# Patient Record
Sex: MALE | Race: WHITE | NOT HISPANIC OR LATINO | Employment: OTHER | URBAN - METROPOLITAN AREA
[De-identification: names, ages, dates, MRNs, and addresses within clinical notes are randomized per-mention and may not be internally consistent; named-entity substitution may affect disease eponyms.]

---

## 2020-03-11 ENCOUNTER — OFFICE VISIT (OUTPATIENT)
Dept: FAMILY MEDICINE CLINIC | Facility: CLINIC | Age: 75
End: 2020-03-11
Payer: MEDICARE

## 2020-03-11 VITALS
HEART RATE: 71 BPM | WEIGHT: 179 LBS | DIASTOLIC BLOOD PRESSURE: 80 MMHG | TEMPERATURE: 97.4 F | SYSTOLIC BLOOD PRESSURE: 126 MMHG | BODY MASS INDEX: 26.51 KG/M2 | RESPIRATION RATE: 18 BRPM | HEIGHT: 69 IN | OXYGEN SATURATION: 99 %

## 2020-03-11 DIAGNOSIS — L03.90 CELLULITIS, UNSPECIFIED CELLULITIS SITE: Primary | ICD-10-CM

## 2020-03-11 PROCEDURE — 1036F TOBACCO NON-USER: CPT | Performed by: FAMILY MEDICINE

## 2020-03-11 PROCEDURE — 1160F RVW MEDS BY RX/DR IN RCRD: CPT | Performed by: FAMILY MEDICINE

## 2020-03-11 PROCEDURE — 99203 OFFICE O/P NEW LOW 30 MIN: CPT | Performed by: FAMILY MEDICINE

## 2020-03-11 PROCEDURE — 3008F BODY MASS INDEX DOCD: CPT | Performed by: FAMILY MEDICINE

## 2020-03-11 RX ORDER — CEPHALEXIN 500 MG/1
500 CAPSULE ORAL EVERY 6 HOURS SCHEDULED
Qty: 40 CAPSULE | Refills: 0
Start: 2020-03-11 | End: 2020-03-11 | Stop reason: SDUPTHER

## 2020-03-11 RX ORDER — TRAMADOL HYDROCHLORIDE 50 MG/1
50 TABLET ORAL EVERY 6 HOURS PRN
Qty: 20 TABLET | Refills: 0
Start: 2020-03-11 | End: 2020-03-11 | Stop reason: SDUPTHER

## 2020-03-11 RX ORDER — CEPHALEXIN 500 MG/1
500 CAPSULE ORAL EVERY 6 HOURS SCHEDULED
Qty: 40 CAPSULE | Refills: 0 | Status: SHIPPED | OUTPATIENT
Start: 2020-03-11 | End: 2020-03-16

## 2020-03-11 RX ORDER — DOXYCYCLINE HYCLATE 100 MG
100 TABLET ORAL 2 TIMES DAILY
COMMUNITY
Start: 2020-03-05 | End: 2020-03-11

## 2020-03-11 RX ORDER — TRAMADOL HYDROCHLORIDE 50 MG/1
50 TABLET ORAL EVERY 6 HOURS PRN
Qty: 20 TABLET | Refills: 0 | Status: SHIPPED | OUTPATIENT
Start: 2020-03-11 | End: 2020-03-16

## 2020-03-11 RX ORDER — TRIAMCINOLONE ACETONIDE 1 MG/G
CREAM TOPICAL 2 TIMES DAILY
COMMUNITY
Start: 2020-03-09 | End: 2020-03-16

## 2020-03-11 RX ORDER — PRAVASTATIN SODIUM 40 MG
40 TABLET ORAL
COMMUNITY
Start: 2019-12-13

## 2020-03-11 NOTE — PROGRESS NOTES
Assessment/Plan:    No problem-specific Assessment & Plan notes found for this encounter  Diagnoses and all orders for this visit:    Cellulitis, unspecified cellulitis site  -     Discontinue: cephalexin (KEFLEX) 500 mg capsule; Take 1 capsule (500 mg total) by mouth every 6 (six) hours for 10 days  -     Discontinue: traMADol (ULTRAM) 50 mg tablet; Take 1 tablet (50 mg total) by mouth every 6 (six) hours as needed for moderate pain  -     Discontinue: cephalexin (KEFLEX) 500 mg capsule; Take 1 capsule (500 mg total) by mouth every 6 (six) hours for 10 days  -     Discontinue: traMADol (ULTRAM) 50 mg tablet; Take 1 tablet (50 mg total) by mouth every 6 (six) hours as needed for moderate pain  -     cephalexin (KEFLEX) 500 mg capsule; Take 1 capsule (500 mg total) by mouth every 6 (six) hours for 10 days  -     traMADol (ULTRAM) 50 mg tablet; Take 1 tablet (50 mg total) by mouth every 6 (six) hours as needed for moderate pain    Other orders  -     aspirin 81 MG tablet; Take 81 mg by mouth daily  -     Discontinue: doxycycline hyclate (VIBRA-TABS) 100 mg tablet; Take 100 mg by mouth 2 (two) times a day  -     insulin detemir (LEVEMIR FLEXTOUCH) 100 Units/mL injection pen; 32 units in AM  -     pravastatin (PRAVACHOL) 40 mg tablet; Take 40 mg by mouth  -     ticagrelor (BRILINTA) 90 MG; Take 90 mg by mouth 2 (two) times a day  -     triamcinolone (KENALOG) 0 1 % cream; Apply topically 2 (two) times a day          Discussed with the patient and all questioned fully answered  He will call me if any problems arise  Subjective:      Patient ID: Cody Singletary is a 66 y o  male  Chief Complaint   Patient presents with   [address]      estab  care,having pain left ankle using antibiotic,cream for  cellulitis,had x-ray done his bone is ok  started 3 wks  ago          66-year-old male comes in complaining of left lower leg pain, patient stated that he had this rash on the left lower leg approximately a week ago, he went to see her PCP had a x-ray done which came back negative was given triamcinolone cream and doxycycline, advised to stop moisturizer, patient stated it is noticed that his rash is getting bigger and the pain is getting worse, deny having fever        The following portions of the patient's history were reviewed and updated as appropriate: allergies, current medications, past family history, past medical history, past social history, past surgical history and problem list       Review of Systems   Constitutional: Negative for activity change, appetite change, fatigue and unexpected weight change  Musculoskeletal: Negative for arthralgias, back pain, gait problem and joint swelling  Neurological: Negative for dizziness and facial asymmetry  Psychiatric/Behavioral: Negative for agitation, behavioral problems, confusion and decreased concentration  Objective:    /80 (BP Location: Left arm, Patient Position: Sitting, Cuff Size: Standard)   Pulse 71   Temp (!) 97 4 °F (36 3 °C) (Tympanic)   Resp 18   Ht 5' 9" (1 753 m)   Wt 81 2 kg (179 lb)   SpO2 99%   BMI 26 43 kg/m²       Physical Exam   Constitutional:  oriented to person, place, and time  well-developed and well-nourished  No distress  Skin: Skin is warm with pitting edema, touch with pain sensation, (+) erythematous LLE, and warm to touch  Psychiatric: normal mood and affect  behavior is normal  Judgment and thought content normal

## 2020-03-16 ENCOUNTER — OFFICE VISIT (OUTPATIENT)
Dept: FAMILY MEDICINE CLINIC | Facility: CLINIC | Age: 75
End: 2020-03-16
Payer: MEDICARE

## 2020-03-16 VITALS
HEIGHT: 69 IN | TEMPERATURE: 97.6 F | OXYGEN SATURATION: 97 % | DIASTOLIC BLOOD PRESSURE: 72 MMHG | BODY MASS INDEX: 26.75 KG/M2 | SYSTOLIC BLOOD PRESSURE: 120 MMHG | WEIGHT: 180.6 LBS | HEART RATE: 68 BPM

## 2020-03-16 DIAGNOSIS — Z28.21 REFUSED INFLUENZA VACCINE: ICD-10-CM

## 2020-03-16 DIAGNOSIS — L03.116 CELLULITIS OF LEFT LOWER EXTREMITY: Primary | ICD-10-CM

## 2020-03-16 DIAGNOSIS — E11.43 DIABETIC AUTONOMIC NEUROPATHY ASSOCIATED WITH TYPE 2 DIABETES MELLITUS (HCC): ICD-10-CM

## 2020-03-16 DIAGNOSIS — L30.9 DERMATITIS: ICD-10-CM

## 2020-03-16 DIAGNOSIS — R60.0 BILATERAL LEG EDEMA: ICD-10-CM

## 2020-03-16 PROCEDURE — 1036F TOBACCO NON-USER: CPT | Performed by: FAMILY MEDICINE

## 2020-03-16 PROCEDURE — 99213 OFFICE O/P EST LOW 20 MIN: CPT | Performed by: FAMILY MEDICINE

## 2020-03-16 PROCEDURE — 1160F RVW MEDS BY RX/DR IN RCRD: CPT | Performed by: FAMILY MEDICINE

## 2020-03-16 RX ORDER — FUROSEMIDE 20 MG/1
TABLET ORAL
Qty: 90 TABLET | OUTPATIENT
Start: 2020-03-16

## 2020-03-16 RX ORDER — CLINDAMYCIN HYDROCHLORIDE 300 MG/1
300 CAPSULE ORAL 3 TIMES DAILY
Qty: 21 CAPSULE | Refills: 0 | Status: SHIPPED | OUTPATIENT
Start: 2020-03-16 | End: 2020-03-19

## 2020-03-16 RX ORDER — GABAPENTIN 300 MG/1
600 CAPSULE ORAL 3 TIMES DAILY
Qty: 180 CAPSULE | Refills: 3 | Status: SHIPPED | OUTPATIENT
Start: 2020-03-16 | End: 2020-03-19

## 2020-03-16 RX ORDER — FUROSEMIDE 20 MG/1
20 TABLET ORAL DAILY
Qty: 5 TABLET | Refills: 0 | Status: SHIPPED | OUTPATIENT
Start: 2020-03-16 | End: 2020-03-19

## 2020-03-16 RX ORDER — CLOBETASOL PROPIONATE 0.5 MG/G
CREAM TOPICAL 2 TIMES DAILY
Qty: 60 G | Refills: 3 | Status: SHIPPED | OUTPATIENT
Start: 2020-03-16 | End: 2020-03-19

## 2020-03-17 NOTE — PROGRESS NOTES
Assessment/Plan:    No problem-specific Assessment & Plan notes found for this encounter  Diagnoses and all orders for this visit:    Cellulitis of left lower extremity  -     clindamycin (CLEOCIN) 300 MG capsule; Take 1 capsule (300 mg total) by mouth 3 (three) times a day for 7 days    Dermatitis  -     clobetasol (TEMOVATE) 0 05 % cream; Apply topically 2 (two) times a day    Bilateral leg edema  -     furosemide (LASIX) 20 mg tablet; Take 1 tablet (20 mg total) by mouth daily    Diabetic autonomic neuropathy associated with type 2 diabetes mellitus (HCC)  -     gabapentin (NEURONTIN) 300 mg capsule; Take 2 capsules (600 mg total) by mouth 3 (three) times a day    Refused influenza vaccine  -     influenza vaccine, 4820-3160, high-dose, PF 0 5 mL (FLUZONE HIGH-DOSE)          Discussed with the patient and all questioned fully answered  He will call me if any problems arise  Subjective:      Patient ID: Martine Bob is a 66 y o  male  Chief Complaint   Patient presents with    Cellulitis     follow up for cellulitis       66year old male, comes in for follow up his cellulitis, pt reports that her cellulitis didn't improve at all, having a lot of pain, despite tamadol, n redness still there, didn't show improvement, no fever      The following portions of the patient's history were reviewed and updated as appropriate: allergies, current medications, past family history, past medical history, past social history, past surgical history and problem list       Review of Systems   Constitutional: Negative for activity change, appetite change, fatigue and unexpected weight change  Skin: Erythematous, mild swelling, sensitive to touch  Psychiatric/Behavioral: Negative for agitation, behavioral problems, confusion and decreased concentration         Objective:    /72 (BP Location: Left arm, Patient Position: Sitting, Cuff Size: Large)   Pulse 68   Temp 97 6 °F (36 4 °C) (Tympanic)   Ht 5' 9" (1 753 m)   Wt 81 9 kg (180 lb 9 6 oz)   SpO2 97%   BMI 26 67 kg/m²       Physical Exam   Constitutional:  oriented to person, place, and time  well-developed and well-nourished  No distress  Skin: Skin is warm and dry  Erythematous, mild swelling, sensitive to touch- left lower leg  Psychiatric: normal mood and affect  behavior is normal  Judgment and thought content normal

## 2020-03-18 DIAGNOSIS — R60.0 BILATERAL LEG EDEMA: ICD-10-CM

## 2020-03-18 RX ORDER — FUROSEMIDE 20 MG/1
TABLET ORAL
Qty: 5 TABLET | Refills: 0 | OUTPATIENT
Start: 2020-03-18

## 2020-03-19 ENCOUNTER — OFFICE VISIT (OUTPATIENT)
Dept: FAMILY MEDICINE CLINIC | Facility: CLINIC | Age: 75
End: 2020-03-19
Payer: MEDICARE

## 2020-03-19 VITALS
HEART RATE: 68 BPM | TEMPERATURE: 96.1 F | WEIGHT: 181 LBS | HEIGHT: 69 IN | BODY MASS INDEX: 26.81 KG/M2 | DIASTOLIC BLOOD PRESSURE: 68 MMHG | RESPIRATION RATE: 16 BRPM | OXYGEN SATURATION: 98 % | SYSTOLIC BLOOD PRESSURE: 110 MMHG

## 2020-03-19 DIAGNOSIS — R60.0 BILATERAL LEG EDEMA: ICD-10-CM

## 2020-03-19 DIAGNOSIS — E11.43 DIABETIC AUTONOMIC NEUROPATHY ASSOCIATED WITH TYPE 2 DIABETES MELLITUS (HCC): ICD-10-CM

## 2020-03-19 DIAGNOSIS — R21 RASH AND OTHER NONSPECIFIC SKIN ERUPTION: ICD-10-CM

## 2020-03-19 DIAGNOSIS — L03.116 CELLULITIS OF LEFT LOWER EXTREMITY: ICD-10-CM

## 2020-03-19 DIAGNOSIS — L30.9 DERMATITIS: ICD-10-CM

## 2020-03-19 DIAGNOSIS — R52 PAIN: Primary | ICD-10-CM

## 2020-03-19 PROCEDURE — 99213 OFFICE O/P EST LOW 20 MIN: CPT | Performed by: FAMILY MEDICINE

## 2020-03-19 PROCEDURE — 1036F TOBACCO NON-USER: CPT | Performed by: FAMILY MEDICINE

## 2020-03-19 PROCEDURE — 1160F RVW MEDS BY RX/DR IN RCRD: CPT | Performed by: FAMILY MEDICINE

## 2020-03-19 RX ORDER — LIDOCAINE HYDROCHLORIDE 20 MG/ML
JELLY TOPICAL 3 TIMES DAILY PRN
Qty: 30 ML | Refills: 3 | Status: SHIPPED | OUTPATIENT
Start: 2020-03-19 | End: 2020-03-23

## 2020-03-19 RX ORDER — GABAPENTIN 300 MG/1
300 CAPSULE ORAL 3 TIMES DAILY
Start: 2020-03-19 | End: 2020-05-26 | Stop reason: SDUPTHER

## 2020-03-19 RX ORDER — PREDNISONE 20 MG/1
40 TABLET ORAL DAILY
Qty: 10 TABLET | Refills: 0 | Status: SHIPPED | OUTPATIENT
Start: 2020-03-19 | End: 2020-03-23

## 2020-03-20 NOTE — PROGRESS NOTES
Assessment/Plan:    No problem-specific Assessment & Plan notes found for this encounter  Diagnoses and all orders for this visit:    Pain  -     lidocaine (XYLOCAINE) 2 % topical gel; Apply topically 3 (three) times a day as needed for mild pain    Dermatitis  -     predniSONE 20 mg tablet; Take 2 tablets (40 mg total) by mouth daily for 5 days    Cellulitis of left lower extremity  -     CBC and differential; Future    Rash and other nonspecific skin eruption  -     Lyme Antibody Profile with reflex to WB  -     Varicella zoster antibody, IgM; Future  -     Sedimentation rate, automated; Future    Bilateral leg edema  -     Comprehensive metabolic panel    Diabetic autonomic neuropathy associated with type 2 diabetes mellitus (HCC)  -     gabapentin (NEURONTIN) 300 mg capsule; Take 1 capsule (300 mg total) by mouth 3 (three) times a day        I think pt may have RDS *reflex sympathetic dystrophy syndrome" will try steroid, lidocaine, and decrease gabapentin, have him follow yp next week  Discussed with the patient and all questioned fully answered  He will call me if any problems arise  Subjective:      Patient ID: Blanca Frank is a 66 y o  male  Chief Complaint   Patient presents with    Follow-up     f/u visit on left lower extremity pain is still the same ,no new food or drug allergies          66year old male, comes in for follow up his cellulitis, pt reports that her cellulitis didn't improve at all, having a lot of pain, unable to tolerate gabapentin 600mg TID, stated he feel wired, and dizzy,  Still having a lot of pain, redness still there, didn't show improvement, no fever        The following portions of the patient's history were reviewed and updated as appropriate: allergies, current medications, past family history, past medical history, past social history, past surgical history and problem list       Review of Systems   Constitutional: Negative for activity change, appetite change, fatigue and unexpected weight change  Musculoskeletal: Negative for arthralgias, back pain, gait problem and joint swelling  Skin: Erythematous, mild swelling, sensitive to touch  Psychiatric/Behavioral: Negative for agitation, behavioral problems, confusion and decreased concentration  Objective:    /68 (BP Location: Left arm, Patient Position: Sitting, Cuff Size: Standard)   Pulse 68   Temp (!) 96 1 °F (35 6 °C) (Tympanic)   Resp 16   Ht 5' 9" (1 753 m)   Wt 82 1 kg (181 lb)   SpO2 98%   BMI 26 73 kg/m²       Physical Exam   Constitutional:  oriented to person, place, and time  well-developed and well-nourished  No distress  Musculoskeletal: Normal range of motion  Neurological:  alert and oriented to person, place, and time  normal reflexes  Skin: Skin is warm and dry  mild Erythematous, mild swelling, sensitive to touch  Psychiatric: normal mood and affect  behavior is normal  Judgment and thought content normal

## 2020-03-23 ENCOUNTER — TELEMEDICINE (OUTPATIENT)
Dept: FAMILY MEDICINE CLINIC | Facility: CLINIC | Age: 75
End: 2020-03-23
Payer: MEDICARE

## 2020-03-23 DIAGNOSIS — G90.50 REFLEX SYMPATHETIC DYSTROPHY: Primary | ICD-10-CM

## 2020-03-23 PROCEDURE — 99443 PR PHYS/QHP TELEPHONE EVALUATION 21-30 MIN: CPT | Performed by: FAMILY MEDICINE

## 2020-03-23 NOTE — PROGRESS NOTES
Virtual Brief Visit    Reason for visit is follow up last visit for rash and pain on the leg      Encounter provider Sid Main MD    Provider located at [address] [place] [id number]      Recent Visits  Date Type Provider Dept   03/19/20 Office Visit Sid Main MD [facility]   03/16/20 Office Visit Sid Main MD [facility]   Showing recent visits within past 7 days and meeting all other requirements     Future Appointments  No visits were found meeting these conditions  Showing future appointments within next 150 days and meeting all other requirements        Patient agrees to participate in a virtual check in via telephone or video visit instead of presenting to the office to address urgent/immediate medical needs  Patient is aware this is a billable service  After connecting through telephone, the patient was identified by name and date of birth  Lizeth Jacinto was informed that this was a telemedicine visit and that the visit is being conducted through telephone which may not be secure and therefore might not be HIPAA-compliant  My office door was closed  No one else was in the room  He acknowledged consent and understanding of privacy and security of the virtual check-in visit  I informed the patient that I have reviewed his record in Epic and presented the opportunity for him to ask any questions regarding the visit today  The patient initiated communication and agreed to participate  Subjective  Lizeth Jacinto is a 66 y o  male, patient has appointment for follow up his pain and possible rashes, patient stated that he still have a lot of pain, however he is only on gabapentin for 3 days, still on prednisone, and the redness still the same, ESR slightly elevated, varicella and lyme's titer still pending         Past Medical History:   Diagnosis Date    Allergic     Chronic leg pain     Resolved 10/16/2014  Coronary artery disease     Diabetes mellitus ([facility] )     Diabetes mellitus type II, uncontrolled ([place] [address] )     Last assessed 8/10/2014     Diabetes mellitus with neurological manifestation ([place] [address] )     Last assessed 10/2/2014     Type 2 diabetes mellitus with hyperglycemia ([place] [address] )     Last assessed 7/9/2013     Varicose veins of both lower extremities     Last assessed 10/11/2010        Past Surgical History:   Procedure Laterality Date    CORONARY STENT PLACEMENT      2 stents placed    VARICOSE VEIN SURGERY      15 yrs  ago both legs  Current Outpatient Medications   Medication Sig Dispense Refill    aspirin 81 MG tablet Take 81 mg by mouth daily      gabapentin (NEURONTIN) 300 mg capsule Take 1 capsule (300 mg total) by mouth 3 (three) times a day      insulin detemir (LEVEMIR FLEXTOUCH) 100 Units/mL injection pen 32 units in AM      pravastatin (PRAVACHOL) 40 mg tablet Take 40 mg by mouth      ticagrelor (BRILINTA) 90 MG Take 90 mg by mouth 2 (two) times a day       No current facility-administered medications for this visit  Allergies   Allergen Reactions    Glyburide      Rash and facial swelling      Latex     Medical Tape     Metformin     Morphine Hives    Penicillins     Prasugrel Hives    Repaglinide      Diarrhea and joint pain    Sulfa Antibiotics     Clopidogrel Rash       Assessment    [place] telephone assessment is Reflex sympathetic dystrophy   Disposition:    Spoke with patient I think that's his diagnosis, advise him to take his gabapentin as prescribe and go up to 2 tabs TID eventually, pt was asking Percocet, advise that's not the best meds for neuropathy pain, in addition to side effect, risk more than benefit, advise to try titrate Gabapentin up first and referred to rheumatologist    I spent 30 minutes with the patient during this virtual check-in visit

## 2020-03-26 ENCOUNTER — TELEPHONE (OUTPATIENT)
Dept: FAMILY MEDICINE CLINIC | Facility: CLINIC | Age: 75
End: 2020-03-26

## 2020-03-26 NOTE — TELEPHONE ENCOUNTER
Rash the same  Gabapentin is helping a little  Patient keep apt for July  To continue gabapentin for the burning pain   Verbalized understanding

## 2020-03-26 NOTE — TELEPHONE ENCOUNTER
Patient is looking for a new rheumatologist referral - the one previously recommended does not have availability until July  Thank you

## 2020-04-01 ENCOUNTER — TELEPHONE (OUTPATIENT)
Dept: FAMILY MEDICINE CLINIC | Facility: CLINIC | Age: 75
End: 2020-04-01

## 2020-04-01 DIAGNOSIS — G90.50 REFLEX NEUROVASCULAR DYSTROPHY: Primary | ICD-10-CM

## 2020-05-26 DIAGNOSIS — E11.43 DIABETIC AUTONOMIC NEUROPATHY ASSOCIATED WITH TYPE 2 DIABETES MELLITUS (HCC): ICD-10-CM

## 2020-05-26 RX ORDER — GABAPENTIN 300 MG/1
300 CAPSULE ORAL 3 TIMES DAILY
Start: 2020-05-26

## 2020-05-28 ENCOUNTER — TELEPHONE (OUTPATIENT)
Dept: FAMILY MEDICINE CLINIC | Facility: CLINIC | Age: 75
End: 2020-05-28

## 2021-05-13 ENCOUNTER — IMMUNIZATIONS (OUTPATIENT)
Dept: FAMILY MEDICINE CLINIC | Facility: HOSPITAL | Age: 76
End: 2021-05-13

## 2021-05-13 DIAGNOSIS — Z23 ENCOUNTER FOR IMMUNIZATION: Primary | ICD-10-CM

## 2021-05-13 PROCEDURE — 0001A SARS-COV-2 / COVID-19 MRNA VACCINE (PFIZER-BIONTECH) 30 MCG: CPT

## 2021-05-13 PROCEDURE — 91300 SARS-COV-2 / COVID-19 MRNA VACCINE (PFIZER-BIONTECH) 30 MCG: CPT

## 2021-06-03 ENCOUNTER — IMMUNIZATIONS (OUTPATIENT)
Dept: FAMILY MEDICINE CLINIC | Facility: HOSPITAL | Age: 76
End: 2021-06-03

## 2021-06-03 DIAGNOSIS — Z23 ENCOUNTER FOR IMMUNIZATION: Primary | ICD-10-CM

## 2021-06-03 PROCEDURE — 91300 SARS-COV-2 / COVID-19 MRNA VACCINE (PFIZER-BIONTECH) 30 MCG: CPT

## 2021-06-03 PROCEDURE — 0002A SARS-COV-2 / COVID-19 MRNA VACCINE (PFIZER-BIONTECH) 30 MCG: CPT

## 2023-12-29 ENCOUNTER — TELEPHONE (OUTPATIENT)
Dept: OTHER | Facility: OTHER | Age: 78
End: 2023-12-29

## 2023-12-29 DIAGNOSIS — M86.18 OTHER ACUTE OSTEOMYELITIS, OTHER SITE (HCC): Primary | ICD-10-CM

## 2023-12-29 RX ORDER — PREGABALIN 75 MG/1
75 CAPSULE ORAL 3 TIMES DAILY
Qty: 30 CAPSULE | Refills: 0 | Status: SHIPPED | OUTPATIENT
Start: 2023-12-29

## 2023-12-29 RX ORDER — OXYCODONE HYDROCHLORIDE 5 MG/1
5 TABLET ORAL ONCE
Qty: 1 TABLET | Refills: 0 | Status: SHIPPED | OUTPATIENT
Start: 2023-12-29 | End: 2023-12-29

## 2024-01-02 ENCOUNTER — NURSING HOME VISIT (OUTPATIENT)
Dept: GERIATRICS | Facility: OTHER | Age: 79
End: 2024-01-02
Payer: MEDICARE

## 2024-01-02 DIAGNOSIS — K25.0 ACUTE GASTRIC ULCER WITH HEMORRHAGE: ICD-10-CM

## 2024-01-02 DIAGNOSIS — M25.562 ACUTE PAIN OF LEFT KNEE: ICD-10-CM

## 2024-01-02 DIAGNOSIS — D62 ACUTE BLOOD LOSS ANEMIA (ABLA): ICD-10-CM

## 2024-01-02 DIAGNOSIS — E11.65 TYPE 2 DIABETES MELLITUS WITH HYPERGLYCEMIA, UNSPECIFIED WHETHER LONG TERM INSULIN USE (HCC): ICD-10-CM

## 2024-01-02 DIAGNOSIS — I25.10 CORONARY ARTERY DISEASE INVOLVING NATIVE CORONARY ARTERY OF NATIVE HEART WITHOUT ANGINA PECTORIS: ICD-10-CM

## 2024-01-02 DIAGNOSIS — M86.9 OSTEOMYELITIS OF SKULL (HCC): ICD-10-CM

## 2024-01-02 DIAGNOSIS — N18.2 CKD (CHRONIC KIDNEY DISEASE) STAGE 2, GFR 60-89 ML/MIN: ICD-10-CM

## 2024-01-02 DIAGNOSIS — H60.22 ACUTE MALIGNANT OTITIS EXTERNA OF LEFT EAR: Primary | ICD-10-CM
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
## 2024-01-02 DIAGNOSIS — K26.9 DUODENAL ULCER: ICD-10-CM

## 2024-01-02 DIAGNOSIS — R53.81 PHYSICAL DECONDITIONING: ICD-10-CM

## 2024-01-02 DIAGNOSIS — K92.2 UPPER GI BLEED: ICD-10-CM

## 2024-01-02 PROBLEM — I10 HYPERTENSION, ESSENTIAL: Chronic | Status: ACTIVE | Noted: 2017-12-03

## 2024-01-02 PROBLEM — K25.3 ACUTE GASTRIC ULCER: Status: ACTIVE | Noted: 2023-12-15

## 2024-01-02 PROBLEM — G50.0 TRIGEMINAL NEURALGIA: Chronic | Status: ACTIVE | Noted: 2023-11-22

## 2024-01-02 PROBLEM — Z95.5 HISTORY OF CORONARY ARTERY STENT PLACEMENT: Chronic | Status: ACTIVE | Noted: 2021-05-06

## 2024-01-02 PROBLEM — Z86.718 HISTORY OF DVT OF LOWER EXTREMITY: Status: ACTIVE | Noted: 2021-05-06

## 2024-01-02 PROCEDURE — 99306 1ST NF CARE HIGH MDM 50: CPT | Performed by: FAMILY MEDICINE

## 2024-01-02 RX ORDER — SENNOSIDES 8.6 MG
975 CAPSULE ORAL 3 TIMES DAILY
COMMUNITY

## 2024-01-02 RX ORDER — SENNOSIDES A AND B 8.6 MG/1
8.6 TABLET, FILM COATED ORAL DAILY
COMMUNITY
Start: 2023-12-30 | End: 2024-01-29

## 2024-01-02 RX ORDER — HYDROCODONE BITARTRATE AND ACETAMINOPHEN 5; 325 MG/1; MG/1
1 TABLET ORAL EVERY 6 HOURS PRN
COMMUNITY
Start: 2023-12-29 | End: 2024-01-03

## 2024-01-02 RX ORDER — FERROUS SULFATE 325(65) MG
325 TABLET ORAL
COMMUNITY
Start: 2023-12-30 | End: 2024-12-29

## 2024-01-02 RX ORDER — NITROGLYCERIN 0.4 MG/1
0.4 TABLET SUBLINGUAL
COMMUNITY

## 2024-01-02 RX ORDER — METRONIDAZOLE 500 MG/1
500 TABLET ORAL 3 TIMES DAILY
COMMUNITY
Start: 2023-12-29 | End: 2024-01-12

## 2024-01-02 RX ORDER — CIPROFLOXACIN 750 MG/1
750 TABLET, FILM COATED ORAL 2 TIMES DAILY
COMMUNITY
Start: 2023-12-29 | End: 2024-02-09

## 2024-01-02 RX ORDER — POLYETHYLENE GLYCOL 3350 17 G/17G
17 POWDER, FOR SOLUTION ORAL DAILY PRN
COMMUNITY
Start: 2023-12-29 | End: 2024-01-12

## 2024-01-02 RX ORDER — CHOLECALCIFEROL (VITAMIN D3) 1250 MCG
5000 CAPSULE ORAL DAILY
COMMUNITY

## 2024-01-02 RX ORDER — METHOCARBAMOL 750 MG/1
750 TABLET, FILM COATED ORAL EVERY 6 HOURS PRN
COMMUNITY
Start: 2023-12-29 | End: 2024-01-08

## 2024-01-02 RX ORDER — PANTOPRAZOLE SODIUM 40 MG/1
1 TABLET, DELAYED RELEASE ORAL
COMMUNITY
Start: 2023-12-29 | End: 2024-12-28

## 2024-01-02 RX ORDER — KETOCONAZOLE 20 MG/G
1 CREAM TOPICAL 2 TIMES DAILY
COMMUNITY
Start: 2023-12-29 | End: 2024-01-10

## 2024-01-02 RX ORDER — BENZONATATE 100 MG/1
100 CAPSULE ORAL 3 TIMES DAILY PRN
COMMUNITY
Start: 2023-12-29 | End: 2024-01-08

## 2024-01-02 RX ORDER — CHOLECALCIFEROL (VITAMIN D3) 125 MCG
5 CAPSULE ORAL
COMMUNITY
Start: 2023-12-29

## 2024-01-02 RX ORDER — CIPROFLOXACIN AND DEXAMETHASONE 3; 1 MG/ML; MG/ML
4 SUSPENSION/ DROPS AURICULAR (OTIC) 2 TIMES DAILY
COMMUNITY
Start: 2023-12-29 | End: 2024-01-03

## 2024-01-02 RX ORDER — DIPHENHYDRAMINE HCL 25 MG
25 CAPSULE ORAL EVERY 6 HOURS PRN
COMMUNITY
Start: 2023-12-29 | End: 2024-01-08

## 2024-01-02 NOTE — PROGRESS NOTES
[facility]  History and Physical  POS: SNF-31    Records Reviewed include: [facility] records    Chief Complaint/ Reason for Admission: Acute malignant otitis externa of left ear with mastoiditis and osteomyelitis; DM2 with hyperglycemia; Trigeminal neuralgia; GI bleed with ABLA    History of Present Illness:            78 year old male admitted for [facility] rehab following prolonged hospitalization at [facility]. Initially presented with left sided facial pain; initial workup and treatment for trigeminal neuralgia; workup for temporal arteritis negative. ENT ultimately consulted and patient found to have malignant otitis externa with associated mastoiditis/osteomyelitis; biopsy growing pseudomonas; evaluated by ID and continues on antibiotic therapy- oral + topical ear drops at present. Continues with complaints of pain and headache; current regimen includes Lyrica 75mg TID and PRN acetaminophen, PRN robaxin and PRN Norco. Has used PRN tylenol x1 over past 24h; no PRN Norco or robaxin use over past 24h. Patient reports pain in his left knee, inferior medial to knee with associated bruising noted on anterior knee; reports a fall at home prior to hospitalization and reports difficulty bearing weight on his LLE due to pain. Hospital course also significant for GI bleed with acute blood loss anemia (required PRBC transfusion); found to have multiple gastric and duodenal ulcers. Additional active medical issues include poorly controlled DM2 with hyperglycemia; Glucose (BID accuchecks) fastin-185 with outlier of 277; QHS: 192-390 over past 48h. Wife at bedside at time of admission exam and provided additional history; plan of care reviewed with patient and wife.  I have spent a total time of 65 minutes on 24 in caring for this patient including extensive review of hospital records (prolonged hospital stay at [facility]  Driss and Maikel Haro from 11/23/23-12/29/23; patient evaluation; reviewed, reconciliation and ordering of medications; review of and ordering of labs; reviewing risks vs benefits of pain medications; coordination of care with nursing.           Allergies   Allergen Reactions    Glyburide      Rash and facial swelling      Latex     Medical Tape     Metformin     Morphine Hives    Penicillins     Prasugrel Hives    Repaglinide      Diarrhea and joint pain    Sulfa Antibiotics     Clopidogrel Rash        Past Medical History  Past Medical History:   Diagnosis Date    Allergic     Chronic leg pain     Resolved 10/16/2014     Coronary artery disease     Diabetes mellitus (HCC)     Diabetes mellitus type II, uncontrolled     Last assessed 8/10/2014     Diabetes mellitus with neurological manifestation (HCC)     Last assessed 10/2/2014     Type 2 diabetes mellitus with hyperglycemia (HCC)     Last assessed 7/9/2013     Varicose veins of both lower extremities     Last assessed 10/11/2010         Past Surgical History:   Procedure Laterality Date    CORONARY STENT PLACEMENT      2 stents placed    VARICOSE VEIN SURGERY      15 yrs. ago both legs.         Family History   Problem Relation Age of Onset    Diabetes Father     COPD Daughter     Breast cancer additional onset Daughter     Diabetes Mother     Heart disease Mother 60        Social History  Tobacco Use: Low Risk  (1/2/2024)    Patient History     Smoking Tobacco Use: Never     Smokeless Tobacco Use: Never     Passive Exposure: Not on file           Physical Exam    Weight: 165.6lb Temp:97.2F BP:135/70 Pulse:95 Resp:18 O2 Sat:94%RA    Physical Exam  Vitals and nursing note reviewed.   Constitutional:       General: He is awake. He is not in acute distress.     Appearance: He is well-groomed. He is not toxic-appearing or diaphoretic.   HENT:      Head: Normocephalic and atraumatic.      Nose: No rhinorrhea.      Mouth/Throat:      Mouth: Mucous membranes are  moist.   Eyes:      General: No scleral icterus.        Right eye: No discharge.         Left eye: No discharge.   Pulmonary:      Effort: Pulmonary effort is normal. No respiratory distress.   Musculoskeletal:      Cervical back: No rigidity.      Left knee: Ecchymosis (inferior-medial patellar area) and bony tenderness (medial tibial plateau point tenderness) present. Normal patellar mobility.      Right lower leg: No edema.      Left lower leg: No edema.   Skin:     Coloration: Skin is not jaundiced or pale.   Neurological:      Mental Status: He is alert.      Cranial Nerves: No dysarthria or facial asymmetry.   Psychiatric:         Attention and Perception: Attention and perception normal.         Speech: Speech normal.         Behavior: Behavior is cooperative.         Thought Content: Thought content normal.       Review of Systems:  Review of Systems   Constitutional:  Negative for chills and fever.   HENT:  Positive for ear pain.    Respiratory:  Negative for cough and shortness of breath.    Musculoskeletal:  Positive for arthralgias and gait problem.   Neurological:  Positive for headaches.        List of Current Medications: Medication list reviewed and updated in Epic to reflect most current SNF orders    Labs/Diagnostics (reviewed by this provider): Hospital Paperwork  BMP (12/28/23) Na 141 K 3.7 Na 141 BUN 15 Creat 0.88 (peak 1.14)  CBC (12/28/23) Hgb 9.2 (min 7.1)  WBC 5.3 Plt 280  CRP (12/26/23) 24.5 <- 48.6 <116.6 <192.8  Tissue Cx (12/14/23) Pseudomonas  A1c (11/23/23) 13.8    Imaging Reviewed:  NM WBC Spect (12/28/23) subtle asymmetric activity of left mastoid region  Xray L knee (12/8/20) remote fibular tip fracture  EGD (12/11/23)  PRBC transfusion (12/8/23, 12/3/23)  CXR (12/7/23) bilateral upper lobe opacities  MRA head/neck (12/3/23)  Echo (12/3/23) EF 60-65%, grade 1 diastolic dysfunction  EKG (12/2/23)    Assessment/Plan:  88 year old male with:    Acute malignant otitis externa of left  ear  Tissue cultures positive Pseudomonas  ID managing antibiotics- continue cipro 750mg BID through 1/22/24 (EKG pending for 1/11/24 to reassess QTc); continue metronidazole 500mg TID through 1/9/24; continue ciprodex ear drops through 1/4/24  Follow up with ID and ENT  Well adjust medications for better pain control: Schedule acetaminophen 975mg TID, max 3g/24h. D/c Norco. Start OxyIR 5mg Q6h PRN severe pain. Continue lyrica 75mg BID.    Osteomyelitis of skull (HCC)  In area of the left mastoid in setting of above    Upper GI bleed  In setting of acute gastric and duodenal ulcers  Continue pantoprazole 40mg BID  Follow up with GI as scheduled  At risk for recurrent bleed on chronic aspirin (CAD s/p stent); per record review, was on prednisone prior to hospitalization as well which may have contributed to ulcer formation    Acute blood loss anemia (ABLA)  In setting of above  S/p PRBC transfusion inpatient  Continue ferrous sulfate  CBC ordered    Type 2 diabetes mellitus with hyperglycemia (HCC)    Lab Results   Component Value Date    HGBA1C 13.8 (H) 11/23/2023   Poorly controlled at baseline; will need close outpatient follow up with PCP and endocrinology  See glucose trends as per HPI- increase levemir to 38u SQ QHS with further titration as needed, with addition of short acting insulin- scheduled vs sliding scale- as needed as well. Goal would be once daily insulin to ease administration at home but may not be able to accomplish this in setting of significant hyperglycemia and A1c elevation- last checked at time of hospital admission  Dietary/nutrition consult    CKD (chronic kidney disease) stage 2, GFR 60-89 ml/min  Baseline creatinine 1.1 per review of previous records  At risk for PAULINO in setting of ABLA and prolonged antibiotic use  BMP ordered    CAD (coronary artery disease)  S/p stent in 2017    Acute pain of left knee  In setting of recent fall prior to hospital admission  With bruising and point  tenderness noted on exam- will check left knee xray- 4 view  Did not have inpatient imaging of his LLE  Xray L knee from 2020 significant for remote fibular tip fracture  Likely underlying component of OA and muscular atrophy due to prolonged hopsitalization/ deconditioning also contributing  Scheduled acetaminophen added as above    Physical deconditioning  Multifactorial  Admit to SNF for rehab  PT and OT consults placed- evaluate and treat  Supportive care, nutritional support, ADL support  Fall precautions   Management of acute and chronic medical conditions as outlined     Advanced Directives: POLST completed following SNF admission  Code status:Full Code  PCP: DO Sherice Marquez,   1/2/24

## 2024-01-03 PROBLEM — I25.10 CAD (CORONARY ARTERY DISEASE): Status: ACTIVE | Noted: 2024-01-03

## 2024-01-03 PROBLEM — M86.9 OSTEOMYELITIS OF SKULL (HCC): Status: ACTIVE | Noted: 2024-01-03
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
## 2024-01-03 PROBLEM — K92.2 UPPER GI BLEED: Status: ACTIVE | Noted: 2024-01-03

## 2024-01-03 PROBLEM — M25.562 ACUTE PAIN OF LEFT KNEE: Status: ACTIVE | Noted: 2024-01-03

## 2024-01-03 PROBLEM — N18.2 CKD (CHRONIC KIDNEY DISEASE) STAGE 2, GFR 60-89 ML/MIN: Status: ACTIVE | Noted: 2024-01-03

## 2024-01-03 PROBLEM — R53.81 PHYSICAL DECONDITIONING: Status: ACTIVE | Noted: 2024-01-03

## 2024-01-03 RX ORDER — OXYCODONE HYDROCHLORIDE 5 MG/1
5 TABLET ORAL EVERY 6 HOURS PRN
Start: 2024-01-03

## 2024-01-03 NOTE — ASSESSMENT & PLAN NOTE
Baseline creatinine 1.1 per review of previous records  At risk for PAULINO in setting of ABLA and prolonged antibiotic use  BMP ordered

## 2024-01-03 NOTE — ASSESSMENT & PLAN NOTE
Lab Results   Component Value Date    HGBA1C 13.8 (H) 11/23/2023   Poorly controlled at baseline; will need close outpatient follow up with PCP and endocrinology  See glucose trends as per HPI- increase levemir to 38u SQ QHS with further titration as needed, with addition of short acting insulin- scheduled vs sliding scale- as needed as well. Goal would be once daily insulin to ease administration at home but may not be able to accomplish this in setting of significant hyperglycemia and A1c elevation- last checked at time of hospital admission  Dietary/nutrition consult

## 2024-01-03 NOTE — ASSESSMENT & PLAN NOTE
Tissue cultures positive Pseudomonas  ID managing antibiotics- continue cipro 750mg BID through 1/22/24 (EKG pending for 1/11/24 to reassess QTc); continue metronidazole 500mg TID through 1/9/24; continue ciprodex ear drops through 1/4/24  Follow up with ID and ENT  Well adjust medications for better pain control: Schedule acetaminophen 975mg TID, max 3g/24h. D/c Norco. Start OxyIR 5mg Q6h PRN severe pain. Continue lyrica 75mg BID.

## 2024-01-03 NOTE — ASSESSMENT & PLAN NOTE
In setting of acute gastric and duodenal ulcers  Continue pantoprazole 40mg BID  Follow up with GI as scheduled  At risk for recurrent bleed on chronic aspirin (CAD s/p stent); per record review, was on prednisone prior to hospitalization as well which may have contributed to ulcer formation

## 2024-01-03 NOTE — ASSESSMENT & PLAN NOTE
Multifactorial  Admit to SNF for rehab  PT and OT consults placed- evaluate and treat  Supportive care, nutritional support, ADL support  Fall precautions   Management of acute and chronic medical conditions as outlined

## 2024-01-03 NOTE — ASSESSMENT & PLAN NOTE
In setting of recent fall prior to hospital admission  With bruising and point tenderness noted on exam- will check left knee xray- 4 view  Did not have inpatient imaging of his LLE  Xray L knee from 2020 significant for remote fibular tip fracture  Likely underlying component of OA and muscular atrophy due to prolonged hopsitalization/ deconditioning also contributing  Scheduled acetaminophen added as above

## 2024-01-04 ENCOUNTER — NURSING HOME VISIT (OUTPATIENT)
Dept: GERIATRICS | Facility: OTHER | Age: 79
End: 2024-01-04
Payer: MEDICARE

## 2024-01-04 VITALS
RESPIRATION RATE: 18 BRPM | SYSTOLIC BLOOD PRESSURE: 107 MMHG | DIASTOLIC BLOOD PRESSURE: 62 MMHG | HEART RATE: 77 BPM | TEMPERATURE: 98 F | OXYGEN SATURATION: 94 %

## 2024-01-04 DIAGNOSIS — I25.10 CORONARY ARTERY DISEASE INVOLVING NATIVE CORONARY ARTERY OF NATIVE HEART WITHOUT ANGINA PECTORIS: ICD-10-CM

## 2024-01-04 DIAGNOSIS — M25.562 ACUTE PAIN OF LEFT KNEE: ICD-10-CM

## 2024-01-04 DIAGNOSIS — E11.65 TYPE 2 DIABETES MELLITUS WITH HYPERGLYCEMIA, UNSPECIFIED WHETHER LONG TERM INSULIN USE (HCC): ICD-10-CM

## 2024-01-04 DIAGNOSIS — R53.81 PHYSICAL DECONDITIONING: ICD-10-CM

## 2024-01-04 DIAGNOSIS — H60.22 ACUTE MALIGNANT OTITIS EXTERNA OF LEFT EAR: ICD-10-CM

## 2024-01-04 DIAGNOSIS — K92.2 UPPER GI BLEED: ICD-10-CM

## 2024-01-04 DIAGNOSIS — M86.9 OSTEOMYELITIS OF SKULL (HCC): Primary | ICD-10-CM

## 2024-01-04 DIAGNOSIS — I10 HYPERTENSION, ESSENTIAL: Chronic | ICD-10-CM

## 2024-01-04 PROCEDURE — 99309 SBSQ NF CARE MODERATE MDM 30: CPT

## 2024-01-04 NOTE — PROGRESS NOTES
[facility]  [facility]  [address], [place], [place]  [phone]  SNF Rehab: POS 31    NAME: Milton Ortiz  AGE: 78 y.o. SEX: male  : 1945     DATE: 2024    CODE STATUS: CPR     Assessment and Plan:     Problem List Items Addressed This Visit       Acute malignant otitis externa of left ear     ID managing antibiotics. Continue antibiotic course until 24.   F/U with ID on 24  Continue current pain medications: Acetaminophen 975 mg TID, OxyIR 5 mg Q6h PRN for severe pain, Lyrica 75 mg BID.   Maintain tight glucose control          Hypertension, essential (Chronic)     BP well controlled   Not currently on antihypertensives  Continue monitoring BP         Type 2 diabetes mellitus with hyperglycemia ([facility])       Lab Results   Component Value Date    HGBA1C 13.8 (H) 2023   Poorly controlled glucose prior to STR  Currently at STR trending 100-200's with a few 300's outliers   Continue Levemir 38 units QHS  Continue accu-checks and f/u with Endo on 24         Osteomyelitis of skull (HCC) - Primary     See above plan for otitis externa          Upper GI bleed     Patient Hgb improved from hospitalization 23 9.2 to 10.1 on 24.   Continue pantoprazole 40 mg BID   No signs of active bleeding  Continue to monitor CBC at STR         CAD (coronary artery disease)     Stable asymptomatic   Continue ASA and statin          Acute pain of left knee     Xray negative  Continue pain control with tylenol   Likely r/t deconditioning d/t prolonged hospitalization          Physical deconditioning     Following prolonged hospitalization   Continue PT and OT   Fall precautions                History of Present Illness:     Patient being seen for STR follow up post hospitalization for acute otitis externa malignant mastoiditis and osteomyelitis. During hospitalization patient was seen to have a GI bleed which has since resolved. Continues on antibiotic Cipro   untill 1/22/24. Patient examined sitting in wheel chair accompanied by his wife. Denies SOB/CP/N/V/D. Patient is not in acute distress. Patient endorses frontal headaches which improves with oxycodone. Of note patient had HA last night that awoke him in the middle of the night and last about an hour.         The following portions of the patient's history were reviewed and updated as appropriate: allergies, current medications, past family history, past medical history, past social history, past surgical history and problem list.     Review of Systems:     Review of Systems   Constitutional:  Positive for appetite change and fatigue.   HENT:  Positive for ear pain.    Musculoskeletal:  Positive for gait problem.   Neurological:  Positive for weakness and headaches.   All other systems reviewed and are negative.       Problem List:     Patient Active Problem List   Diagnosis    Acute blood loss anemia (ABLA)    Acute gastric ulcer    Acute malignant otitis externa of left ear    Duodenal ulcer    History of DVT of lower extremity    History of coronary artery stent placement    Hypertension, essential    Lumbar radiculopathy    Trigeminal neuralgia    Type 2 diabetes mellitus with hyperglycemia (HCC)    Osteomyelitis of skull (HCC)    Upper GI bleed    CKD (chronic kidney disease) stage 2, GFR 60-89 ml/min    CAD (coronary artery disease)    Acute pain of left knee    Fall    Physical deconditioning        Objective:     /62   Pulse 77   Temp 98 °F (36.7 °C)   Resp 18   SpO2 94%     Physical Exam  Vitals and nursing note reviewed.   Constitutional:       General: He is not in acute distress.     Appearance: He is well-developed.   HENT:      Head: Normocephalic and atraumatic.   Eyes:      Conjunctiva/sclera: Conjunctivae normal.   Cardiovascular:      Rate and Rhythm: Normal rate and regular rhythm.      Heart sounds: No murmur heard.  Pulmonary:      Effort: Pulmonary effort is normal. No respiratory  distress.      Breath sounds: Normal breath sounds.   Abdominal:      Palpations: Abdomen is soft.      Tenderness: There is no abdominal tenderness.   Musculoskeletal:         General: No swelling.      Cervical back: Neck supple.   Skin:     General: Skin is warm and dry.      Capillary Refill: Capillary refill takes less than 2 seconds.      Coloration: Skin is pale.   Neurological:      General: No focal deficit present.      Mental Status: He is alert and oriented to person, place, and time.      Motor: Weakness present.      Gait: Gait abnormal.   Psychiatric:         Mood and Affect: Mood normal.         Pertinent Laboratory/Diagnostic Studies:    Laboratory Results: I have personally reviewed the pertinent laboratory results/reports     Radiology/Other Diagnostic Testing Results: I have personally reviewed pertinent reports.      JENNIFER Alejandro  Geriatric Medicine

## 2024-01-04 NOTE — ASSESSMENT & PLAN NOTE
Patient Hgb improved from hospitalization 12/26/23 9.2 to 10.1 on 1/2/24.   Continue pantoprazole 40 mg BID   No signs of active bleeding  Continue to monitor CBC at STR

## 2024-01-04 NOTE — ASSESSMENT & PLAN NOTE
Xray negative  Continue pain control with tylenol   Likely r/t deconditioning d/t prolonged hospitalization

## 2024-01-04 NOTE — ASSESSMENT & PLAN NOTE
ID managing antibiotics. Continue antibiotic course until 1/22/24.   F/U with ID on 1/11/24  Continue current pain medications: Acetaminophen 975 mg TID, OxyIR 5 mg Q6h PRN for severe pain, Lyrica 75 mg BID.   Maintain tight glucose control

## 2024-01-04 NOTE — ASSESSMENT & PLAN NOTE
Lab Results   Component Value Date    HGBA1C 13.8 (H) 11/23/2023   Poorly controlled glucose prior to STR  Currently at STR trending 100-200's with a few 300's outliers   Continue Levemir 38 units QHS  Continue accu-checks and f/u with Endo on 1/17/24

## 2024-01-08 ENCOUNTER — NURSING HOME VISIT (OUTPATIENT)
Dept: GERIATRICS | Facility: OTHER | Age: 79
End: 2024-01-08
Payer: MEDICARE

## 2024-01-08 DIAGNOSIS — E11.65 TYPE 2 DIABETES MELLITUS WITH HYPERGLYCEMIA, UNSPECIFIED WHETHER LONG TERM INSULIN USE (HCC): ICD-10-CM

## 2024-01-08 DIAGNOSIS — G50.0 TRIGEMINAL NEURALGIA: Chronic | ICD-10-CM

## 2024-01-08 DIAGNOSIS — K92.2 UPPER GI BLEED: ICD-10-CM

## 2024-01-08 DIAGNOSIS — N18.2 CKD (CHRONIC KIDNEY DISEASE) STAGE 2, GFR 60-89 ML/MIN: ICD-10-CM

## 2024-01-08 DIAGNOSIS — D62 ACUTE BLOOD LOSS ANEMIA (ABLA): ICD-10-CM

## 2024-01-08 DIAGNOSIS — H61.23 EXCESSIVE CERUMEN IN EAR CANAL, BILATERAL: ICD-10-CM

## 2024-01-08 DIAGNOSIS — H60.22 ACUTE MALIGNANT OTITIS EXTERNA OF LEFT EAR: Primary | ICD-10-CM

## 2024-01-08 PROCEDURE — 99309 SBSQ NF CARE MODERATE MDM 30: CPT | Performed by: NURSE PRACTITIONER

## 2024-01-08 NOTE — PROGRESS NOTES
[facility] at [address]  [place]  [address]  [phone]    SNF Rehab: POS 31  Progress Note    ASSESSMENT AND PLAN:  1. Acute malignant otitis externa of left ear  Assessment & Plan:  Stable  Continue antibiotics as ordered by ID until 2024  Continue multimodal pain medication regime  Follow-up with ID on 2024      2. Trigeminal neuralgia  Assessment & Plan:  Continue multimodal pain regime        3. Type 2 diabetes mellitus with hyperglycemia, unspecified whether long term insulin use (HCC)  Assessment & Plan:    Lab Results   Component Value Date    HGBA1C 13.8 (H) 2023   History of poorly controlled glucose levels  Blood sugars trending 100 range in the am and 200-300's in the evenings.  Continue Levemir 38 units at HS  Continue accu-checks two times daily  Follow-up with Endocrine as scheduled 24      4. Acute blood loss anemia (ABLA)  Assessment & Plan:  In the setting of above  S/p PRBC transfusion inpatient  Hemoglobin currently improving at 10.1  Continue ferrous sulfate  Will monitor CBC      5. Upper GI bleed  Assessment & Plan:  Improved since hospitalization, hemoglobin improved from 9.2 on  to 10.1 on   Continue pantoprazole 40 mg bid  Will continue to monitor CBC      6. CKD (chronic kidney disease) stage 2, GFR 60-89 ml/min  Assessment & Plan:  Baseline creatinine 1.1  Creatinine currently 0.8 on   Patient at risk for PAULINO in the setting of ABLA and prolonged antibiotic use  Will monitor BMP      7. Excessive cerumen in ear canal, bilateral  Assessment & Plan:  Recommend follow-up with ENT for ear cleaning due to recent ear surgery            Name: Milton Ortiz                 :  1945              Sex: Male     HPI:    78 year old patient seen and examined today to follow-up on acute and chronic medical conditions in SNF rehab. He is status post hospitalization for acute otitis externa, malignant  mastoiditis and osteomyelitis. He is on Ciprofloxacin until 01/22/24.  Patient complains of occasional frontal headaches that are relieved with multimodal pain regimen including tylenol and OxyIR.  He currently denies headache. He reports loose stools and is requesting that Senna and Miralax be discontinued. No abdominal pain, n/v reported. His other history includes DM2 with hypo/hyperglycemia, trigeminal neuralgia and GI bleed with ABLA.      The following portions of the patient's history were reviewed and updated as appropriate: allergies, current medications, past family history, past medical history, past social history, past surgical history and problem list.    ROS: Review of Systems   Constitutional:  Negative for chills and fever.   HENT:  Positive for ear pain (left side) and hearing loss (left side). Negative for mouth sores.    Eyes:  Positive for pain (left eye brow area). Negative for photophobia, discharge, redness, itching and visual disturbance.   Respiratory:  Negative for chest tightness and shortness of breath.    Cardiovascular:  Negative for chest pain, palpitations and leg swelling.   Gastrointestinal:  Positive for diarrhea.   Genitourinary:  Negative for difficulty urinating and dysuria.   Musculoskeletal:  Positive for myalgias (d/t PT).   Skin:  Positive for rash.        Rash of forehead    Neurological:  Positive for headaches. Negative for dizziness.        Intermittent headaches   All other systems reviewed and are negative.      Allergies   Allergen Reactions    Glyburide      Rash and facial swelling      Latex     Medical Tape     Metformin     Morphine Hives    Penicillins     Prasugrel Hives    Repaglinide      Diarrhea and joint pain    Sulfa Antibiotics     Clopidogrel Rash       Medications:    Current Outpatient Medications on File Prior to Visit   Medication Sig Dispense Refill    acetaminophen (TYLENOL) 650 mg CR tablet Take 975 mg by mouth 3 (three) times a day      aspirin  81 MG tablet Take 81 mg by mouth daily      Carboxymethylcellulose Sodium (REFRESH CELLUVISC OP) Apply 1 drop to eye Three times a day      Cholecalciferol (Vitamin D3) 1.25 MG (14832 UT) CAPS Take 5,000 Units by mouth daily      ciprofloxacin (CIPRO) 750 mg tablet Take 750 mg by mouth 2 (two) times a day      diphenhydrAMINE (Benadryl Allergy) 25 mg capsule Take 25 mg by mouth every 6 (six) hours as needed for itching      ferrous sulfate 325 (65 Fe) mg tablet Take 325 mg by mouth daily with breakfast      insulin detemir (LEVEMIR FLEXTOUCH) 100 Units/mL injection pen Inject 38 Units under the skin daily at bedtime      Melatonin 5 MG TABS Take 5 mg by mouth daily at bedtime      nitroglycerin (NITROSTAT) 0.4 mg SL tablet Place 0.4 mg under the tongue every 5 (five) minutes as needed for chest pain      oxyCODONE (ROXICODONE) 5 immediate release tablet Take 1 tablet (5 mg total) by mouth every 6 (six) hours as needed for severe pain Max Daily Amount: 20 mg      pantoprazole (PROTONIX) 40 mg tablet Take 1 tablet by mouth 2 (two) times a day before meals      pravastatin (PRAVACHOL) 40 mg tablet Take 40 mg by mouth      pregabalin (LYRICA) 75 mg capsule Take 1 capsule (75 mg total) by mouth 3 (three) times a day 30 capsule 0     No current facility-administered medications on file prior to visit.       History:  Past Medical History:   Diagnosis Date    Allergic     Chronic leg pain     Resolved 10/16/2014     Coronary artery disease     Diabetes mellitus (HCC)     Diabetes mellitus type II, uncontrolled     Last assessed 8/10/2014     Diabetes mellitus with neurological manifestation (HCC)     Last assessed 10/2/2014     Type 2 diabetes mellitus with hyperglycemia (HCC)     Last assessed 7/9/2013     Varicose veins of both lower extremities     Last assessed 10/11/2010      Past Surgical History:   Procedure Laterality Date    CORONARY STENT PLACEMENT      2 stents placed    VARICOSE VEIN SURGERY      15 yrs. ago  both legs.      Family History   Problem Relation Age of Onset    Diabetes Father     COPD Daughter     Breast cancer additional onset Daughter     Diabetes Mother     Heart disease Mother 60     Social History     Socioeconomic History    Marital status: /Civil Union     Spouse name: Not on file    Number of children: Not on file    Years of education: Not on file    Highest education level: Not on file   Occupational History    Not on file   Tobacco Use    Smoking status: Never    Smokeless tobacco: Never    Tobacco comments:     Former smoker - As per [employer]    Substance and Sexual Activity    Alcohol use: Never    Drug use: Never    Sexual activity: Not Currently     Partners: Female   Other Topics Concern    Not on file   Social History Narrative    Not on file     Social Determinants of Health     Financial Resource Strain: Low Risk  (12/3/2023)    Received from [facility]    Overall Financial Resource Strain (CARDIA)     Difficulty of Paying Living Expenses: Not hard at all   Food Insecurity: No Food Insecurity (12/3/2023)    Received from [facility]    Hunger Vital Sign     Worried About Running Out of Food in the Last Year: Never true     Ran Out of Food in the Last Year: Never true   Transportation Needs: No Transportation Needs (12/3/2023)    Received from [facility]    PRAPARE - Transportation     Lack of Transportation (Medical): No     Lack of Transportation (Non-Medical): No   Physical Activity: Inactive (3/11/2020)    Exercise Vital Sign     Days of Exercise per Week: 0 days     Minutes of Exercise per Session: 0 min   Stress: No Stress Concern Present (3/11/2020)    [employer] [place] [employer] - Occupational Stress Questionnaire     Feeling of Stress : Only a little   Social Connections: Moderately Isolated (3/11/2020)    Social Connection and Isolation Panel [NHANES]     Frequency of Communication with Friends and Family:  More than three times a week     Frequency of Social Gatherings with Friends and Family: More than three times a week     Attends Synagogue Services: Never     Active Member of Clubs or Organizations: No     Attends Club or Organization Meetings: Never     Marital Status:    Intimate Partner Violence: Not At Risk (12/3/2023)    Received from [facility]    Humiliation, Afraid, Rape, and Kick questionnaire     Fear of Current or Ex-Partner: No     Emotionally Abused: No     Physically Abused: No     Sexually Abused: No   Housing Stability: Low Risk  (12/3/2023)    Received from [facility]    Housing Stability Vital Sign     Unable to Pay for Housing in the Last Year: No     Number of Places Lived in the Last Year: 1     Unstable Housing in the Last Year: No     Past Surgical History:   Procedure Laterality Date    CORONARY STENT PLACEMENT      2 stents placed    VARICOSE VEIN SURGERY      15 yrs. ago both legs.        OBJECTIVE:  Vital Signs:  /72, Temp 97.7, HR 98, RR 16, SpO2 97% RA, Wgt: 175.5 lbs     Physical Exam  Constitutional:       General: He is not in acute distress.     Appearance: Normal appearance.   HENT:      Head: Normocephalic and atraumatic.      Right Ear: There is impacted cerumen.      Left Ear: There is impacted cerumen.      Mouth/Throat:      Mouth: Mucous membranes are moist.      Pharynx: Oropharynx is clear.   Eyes:      General:         Right eye: No discharge.         Left eye: No discharge.      Extraocular Movements: Extraocular movements intact.   Cardiovascular:      Rate and Rhythm: Normal rate and regular rhythm.      Heart sounds: Normal heart sounds. No murmur heard.  Pulmonary:      Effort: Pulmonary effort is normal. No respiratory distress.      Breath sounds: Normal breath sounds.   Abdominal:      Tenderness: There is no abdominal tenderness.   Musculoskeletal:      Right lower leg: No edema.      Left lower leg: No edema.    Skin:     General: Skin is warm and dry.      Findings: Rash present.      Comments: Chronic rash of forehead   Neurological:      General: No focal deficit present.      Mental Status: He is alert and oriented to person, place, and time.      Motor: Weakness present.      Gait: Gait abnormal.   Psychiatric:         Mood and Affect: Mood normal.         Behavior: Behavior normal.         Labs & Imaging Reviewed in facility EMR.     JENNIFER Baum  Geriatric Medicine  01/08/2023

## 2024-01-10 ENCOUNTER — NURSING HOME VISIT (OUTPATIENT)
Dept: GERIATRICS | Facility: OTHER | Age: 79
End: 2024-01-10
Payer: MEDICARE

## 2024-01-10 DIAGNOSIS — H60.22 ACUTE MALIGNANT OTITIS EXTERNA OF LEFT EAR: Primary | ICD-10-CM

## 2024-01-10 DIAGNOSIS — K92.2 UPPER GI BLEED: ICD-10-CM

## 2024-01-10 DIAGNOSIS — E87.8 ELECTROLYTE DISTURBANCE: ICD-10-CM

## 2024-01-10 DIAGNOSIS — N18.2 CKD (CHRONIC KIDNEY DISEASE) STAGE 2, GFR 60-89 ML/MIN: ICD-10-CM

## 2024-01-10 DIAGNOSIS — D62 ACUTE BLOOD LOSS ANEMIA (ABLA): ICD-10-CM

## 2024-01-10 DIAGNOSIS — H61.23 EXCESSIVE CERUMEN IN EAR CANAL, BILATERAL: ICD-10-CM

## 2024-01-10 DIAGNOSIS — E11.65 TYPE 2 DIABETES MELLITUS WITH HYPERGLYCEMIA, UNSPECIFIED WHETHER LONG TERM INSULIN USE (HCC): ICD-10-CM

## 2024-01-10 PROCEDURE — 99309 SBSQ NF CARE MODERATE MDM 30: CPT | Performed by: NURSE PRACTITIONER

## 2024-01-10 NOTE — PROGRESS NOTES
[facility] at [address]  [place]  [address]  [phone]    SNF Rehab: POS 31  Progress Note    ASSESSMENT AND PLAN:  1. Acute malignant otitis externa of left ear  Assessment & Plan:  Stable  Continue antibiotics as ordered by ID until 2024  Continue multimodal pain medication regime  Follow-up with ID on 2024      2. Electrolyte disturbance   Assessment & Plan:  Potassium low at 3.4 and Sodium elevated at 147  Will replete potassium  Encourage free water intake  Repeat BMP ordered.       3. Type 2 diabetes mellitus with hyperglycemia, unspecified whether long term insulin use (HCC)  Assessment & Plan:    Lab Results   Component Value Date    HGBA1C 13.8 (H) 2023   History of poorly controlled glucose levels  Blood sugars trending 100 range in the am and 200-300's in the evenings.  FBS 56 this am, improved after snack given  Will reduce Levemir to 30 units  at HS and give HS snack  Continue accu-checks two times daily  Follow-up with Endocrine as scheduled 24      4. Acute blood loss anemia (ABLA)  Assessment & Plan:  In the setting of above  S/p PRBC transfusion inpatient  Hemoglobin currently improving from 10.1 to now 10.3  Continue ferrous sulfate  Will monitor CBC      5. Upper GI bleed  Assessment & Plan:  Improved since hospitalization, hemoglobin improved from 9.2 on  to 10.1 on  and is 10.3 today.   Continue pantoprazole 40 mg bid      6. CKD (chronic kidney disease) stage 2, GFR 60-89 ml/min  Assessment & Plan:  Baseline creatinine 1.1  Creatinine currently 0.95 today, 01/10.   Patient at risk for PAULINO in the setting of ABLA and prolonged antibiotic use  Will monitor BMP      7. Excessive cerumen in ear canal, bilateral  Assessment & Plan:  Recommend follow-up with ENT for ear cleaning due to recent ear surgery    Name: Milton Ortiz                 :  1945              Sex: Male     HPI:    78 year old patient  seen and examined today to follow-up on acute and chronic medical conditions in SNF rehab. He is status post hospitalization for acute otitis externa, malignant mastoiditis and osteomyelitis. He is on Ciprofloxacin until 01/22/24.  Patient complains of occasional frontal headaches that are relieved with multimodal pain regimen including tylenol and OxyIR.  He currently denies headache or acute complaint. His other history includes DM2 with hypo/hyperglycemia, trigeminal neuralgia and GI bleed with ABLA.      The following portions of the patient's history were reviewed and updated as appropriate: allergies, current medications, past family history, past medical history, past social history, past surgical history and problem list.    ROS: Review of Systems   Constitutional:  Negative for chills and fever.   HENT:  Positive for ear pain (left side) and hearing loss (left side). Negative for mouth sores.    Eyes:  Positive for pain (left eye brow area). Negative for photophobia, discharge, redness, itching and visual disturbance.   Respiratory:  Negative for chest tightness and shortness of breath.    Cardiovascular:  Negative for chest pain, palpitations and leg swelling.   Gastrointestinal:  Positive for diarrhea.   Genitourinary:  Negative for difficulty urinating and dysuria.   Musculoskeletal:  Positive for myalgias (d/t PT).   Skin:  Positive for rash.        Rash of forehead    Neurological:  Positive for headaches. Negative for dizziness.        Intermittent headaches   All other systems reviewed and are negative.      Allergies   Allergen Reactions    Glyburide      Rash and facial swelling      Latex     Medical Tape     Metformin     Morphine Hives    Penicillins     Prasugrel Hives    Repaglinide      Diarrhea and joint pain    Sulfa Antibiotics     Clopidogrel Rash       Medications:    Current Outpatient Medications on File Prior to Visit   Medication Sig Dispense Refill    acetaminophen (TYLENOL) 650 mg  CR tablet Take 975 mg by mouth 3 (three) times a day      aspirin 81 MG tablet Take 81 mg by mouth daily      Carboxymethylcellulose Sodium (REFRESH CELLUVISC OP) Apply 1 drop to eye Three times a day      Cholecalciferol (Vitamin D3) 1.25 MG (16034 UT) CAPS Take 5,000 Units by mouth daily      ciprofloxacin (CIPRO) 750 mg tablet Take 750 mg by mouth 2 (two) times a day      diphenhydrAMINE (Benadryl Allergy) 25 mg capsule Take 25 mg by mouth every 6 (six) hours as needed for itching      ferrous sulfate 325 (65 Fe) mg tablet Take 325 mg by mouth daily with breakfast      insulin detemir (LEVEMIR FLEXTOUCH) 100 Units/mL injection pen Inject 38 Units under the skin daily at bedtime      Melatonin 5 MG TABS Take 5 mg by mouth daily at bedtime      nitroglycerin (NITROSTAT) 0.4 mg SL tablet Place 0.4 mg under the tongue every 5 (five) minutes as needed for chest pain      oxyCODONE (ROXICODONE) 5 immediate release tablet Take 1 tablet (5 mg total) by mouth every 6 (six) hours as needed for severe pain Max Daily Amount: 20 mg      pantoprazole (PROTONIX) 40 mg tablet Take 1 tablet by mouth 2 (two) times a day before meals      pravastatin (PRAVACHOL) 40 mg tablet Take 40 mg by mouth      pregabalin (LYRICA) 75 mg capsule Take 1 capsule (75 mg total) by mouth 3 (three) times a day 30 capsule 0     No current facility-administered medications on file prior to visit.       History:  Past Medical History:   Diagnosis Date    Allergic     Chronic leg pain     Resolved 10/16/2014     Coronary artery disease     Diabetes mellitus (HCC)     Diabetes mellitus type II, uncontrolled     Last assessed 8/10/2014     Diabetes mellitus with neurological manifestation (HCC)     Last assessed 10/2/2014     Type 2 diabetes mellitus with hyperglycemia (HCC)     Last assessed 7/9/2013     Varicose veins of both lower extremities     Last assessed 10/11/2010      Past Surgical History:   Procedure Laterality Date    CORONARY STENT PLACEMENT       2 stents placed    VARICOSE VEIN SURGERY      15 yrs. ago both legs.      Family History   Problem Relation Age of Onset    Diabetes Father     COPD Daughter     Breast cancer additional onset Daughter     Diabetes Mother     Heart disease Mother 60     Social History     Socioeconomic History    Marital status: /Civil Union     Spouse name: Not on file    Number of children: Not on file    Years of education: Not on file    Highest education level: Not on file   Occupational History    Not on file   Tobacco Use    Smoking status: Never    Smokeless tobacco: Never    Tobacco comments:     Former smoker - As per [employer]    Substance and Sexual Activity    Alcohol use: Never    Drug use: Never    Sexual activity: Not Currently     Partners: Female   Other Topics Concern    Not on file   Social History Narrative    Not on file     Social Determinants of Health     Financial Resource Strain: Low Risk  (12/3/2023)    Received from [facility]    Overall Financial Resource Strain (CARDIA)     Difficulty of Paying Living Expenses: Not hard at all   Food Insecurity: No Food Insecurity (12/3/2023)    Received from [facility]    Hunger Vital Sign     Worried About Running Out of Food in the Last Year: Never true     Ran Out of Food in the Last Year: Never true   Transportation Needs: No Transportation Needs (12/3/2023)    Received from [facility]    PRAPARE - Transportation     Lack of Transportation (Medical): No     Lack of Transportation (Non-Medical): No   Physical Activity: Inactive (3/11/2020)    Exercise Vital Sign     Days of Exercise per Week: 0 days     Minutes of Exercise per Session: 0 min   Stress: No Stress Concern Present (3/11/2020)    [employer] [place] [employer] - Occupational Stress Questionnaire     Feeling of Stress : Only a little   Social Connections: Moderately Isolated (3/11/2020)    Social Connection and Isolation Panel  [NHANES]     Frequency of Communication with Friends and Family: More than three times a week     Frequency of Social Gatherings with Friends and Family: More than three times a week     Attends Protestant Services: Never     Active Member of Clubs or Organizations: No     Attends Club or Organization Meetings: Never     Marital Status:    Intimate Partner Violence: Not At Risk (12/3/2023)    Received from [facility]    Humiliation, Afraid, Rape, and Kick questionnaire     Fear of Current or Ex-Partner: No     Emotionally Abused: No     Physically Abused: No     Sexually Abused: No   Housing Stability: Low Risk  (12/3/2023)    Received from [facility]    Housing Stability Vital Sign     Unable to Pay for Housing in the Last Year: No     Number of Places Lived in the Last Year: 1     Unstable Housing in the Last Year: No     Past Surgical History:   Procedure Laterality Date    CORONARY STENT PLACEMENT      2 stents placed    VARICOSE VEIN SURGERY      15 yrs. ago both legs.        OBJECTIVE:  Vital Signs:  /62, Temp 96.8, HR 80, RR 18, SpO2 94% RA, Wgt: 174.4 lbs     Physical Exam  Constitutional:       General: He is not in acute distress.     Appearance: Normal appearance.   HENT:      Head: Normocephalic and atraumatic.      Right Ear: There is impacted cerumen.      Left Ear: There is impacted cerumen.      Mouth/Throat:      Mouth: Mucous membranes are moist.      Pharynx: Oropharynx is clear.   Eyes:      General:         Right eye: No discharge.         Left eye: No discharge.      Extraocular Movements: Extraocular movements intact.   Cardiovascular:      Rate and Rhythm: Normal rate and regular rhythm.      Heart sounds: Normal heart sounds. No murmur heard.  Pulmonary:      Effort: Pulmonary effort is normal. No respiratory distress.      Breath sounds: Normal breath sounds.   Abdominal:      Tenderness: There is no abdominal tenderness.   Musculoskeletal:       Right lower leg: No edema.      Left lower leg: No edema.   Skin:     General: Skin is warm and dry.      Findings: Rash present.      Comments: Chronic rash of forehead   Neurological:      General: No focal deficit present.      Mental Status: He is alert and oriented to person, place, and time.      Motor: Weakness present.      Gait: Gait abnormal.   Psychiatric:         Mood and Affect: Mood normal.         Behavior: Behavior normal.         Labs & Imaging Reviewed in facility EMR.     JENNIFER Baum  Geriatric Medicine  01/10/2023

## 2024-01-13 ENCOUNTER — TELEPHONE (OUTPATIENT)
Dept: OTHER | Facility: OTHER | Age: 79
End: 2024-01-13

## 2024-01-13 NOTE — TELEPHONE ENCOUNTER
[phone] / Hazel from [facility] is calling to report pt glucose was 68, and the follow up was 101. No call back needed

## 2024-01-14 PROBLEM — E87.8 ELECTROLYTE DISTURBANCE: Status: ACTIVE | Noted: 2024-01-10

## 2024-01-14 PROBLEM — H61.23 EXCESSIVE CERUMEN IN EAR CANAL, BILATERAL: Status: ACTIVE | Noted: 2024-01-08

## 2024-01-14 NOTE — ASSESSMENT & PLAN NOTE
In the setting of above  S/p PRBC transfusion inpatient  Hemoglobin currently improving at 10.1  Continue ferrous sulfate  Will monitor CBC

## 2024-01-14 NOTE — ASSESSMENT & PLAN NOTE
Stable  Continue antibiotics as ordered by ID until 01/22/2024  Continue multimodal pain medication regime  Follow-up with ID on 01/11/2024

## 2024-01-14 NOTE — ASSESSMENT & PLAN NOTE
Lab Results   Component Value Date    HGBA1C 13.8 (H) 11/23/2023   History of poorly controlled glucose levels  Blood sugars trending 100 range in the am and 200-300's in the evenings.  Continue Levemir 38 units at HS  Continue accu-checks two times daily  Follow-up with Endocrine as scheduled 01/17/24

## 2024-01-14 NOTE — ASSESSMENT & PLAN NOTE
Stable, no signs/symptoms reported  Continue ASA and statin  Monitor for bleeding in the setting of ASA use.

## 2024-01-14 NOTE — ASSESSMENT & PLAN NOTE
Baseline creatinine 1.1  Creatinine currently 0.8 on 01/02  Patient at risk for PAULINO in the setting of ABLA and prolonged antibiotic use  Will monitor BMP

## 2024-01-14 NOTE — ASSESSMENT & PLAN NOTE
Improved since hospitalization, hemoglobin improved from 9.2 on 12/26 to 10.1 on 01/02  Continue pantoprazole 40 mg bid  Will continue to monitor CBC

## 2024-01-15 ENCOUNTER — NURSING HOME VISIT (OUTPATIENT)
Dept: GERIATRICS | Facility: OTHER | Age: 79
End: 2024-01-15
Payer: MEDICARE

## 2024-01-15 VITALS
SYSTOLIC BLOOD PRESSURE: 96 MMHG | DIASTOLIC BLOOD PRESSURE: 56 MMHG | HEART RATE: 92 BPM | OXYGEN SATURATION: 96 % | TEMPERATURE: 97.7 F | RESPIRATION RATE: 20 BRPM

## 2024-01-15 DIAGNOSIS — E11.65 TYPE 2 DIABETES MELLITUS WITH HYPERGLYCEMIA, UNSPECIFIED WHETHER LONG TERM INSULIN USE (HCC): Primary | ICD-10-CM

## 2024-01-15 DIAGNOSIS — I10 HYPERTENSION, ESSENTIAL: Chronic | ICD-10-CM

## 2024-01-15 DIAGNOSIS — H60.22 ACUTE MALIGNANT OTITIS EXTERNA OF LEFT EAR: ICD-10-CM

## 2024-01-15 DIAGNOSIS — R53.81 PHYSICAL DECONDITIONING: ICD-10-CM

## 2024-01-15 DIAGNOSIS — D62 ACUTE BLOOD LOSS ANEMIA (ABLA): ICD-10-CM

## 2024-01-15 DIAGNOSIS — K92.2 UPPER GI BLEED: ICD-10-CM

## 2024-01-15 PROCEDURE — 99309 SBSQ NF CARE MODERATE MDM 30: CPT

## 2024-01-15 RX ORDER — INSULIN GLARGINE 100 [IU]/ML
30 INJECTION, SOLUTION SUBCUTANEOUS DAILY
COMMUNITY

## 2024-01-15 NOTE — ASSESSMENT & PLAN NOTE
Lab Results   Component Value Date    HGBA1C 13.8 (H) 11/23/2023     Hx of poorly controlled blood sugars  Blood sugars in the am have been ranging 60's - low 100's  Glargine was decreased from 38 units to 30 at HS on 1/10/24 and patient continues to have low BS in am.  Will change administration time of glargine to am and monitor for improvement  Continue snack at HS  Continue accu-checks 2x daily  Endocrine f/u scheduled for 1/17/24

## 2024-01-15 NOTE — ASSESSMENT & PLAN NOTE
Antibiotics scheduled until 1/22/24  Pain controlled with multimodal pain regime.   ID following   Continue to monitor pain and signs of infection

## 2024-01-15 NOTE — ASSESSMENT & PLAN NOTE
Hgb has improved since admission to STR (1/2/24: Hgb 10.1-- 1/10/24: Hgb 10.3)  Continue Pantoprazole 40 mg BID before meals   No signs of active GI bleed.  Continue to closely monitor for changes (dizziness, black tarry stool, blood in vomit, abdominal cramps, paleness, SOB)

## 2024-01-15 NOTE — ASSESSMENT & PLAN NOTE
S/P transfusion inpatient  Hgb has improved since admission to [facility], Hgb currently 10.1 and patient is asymptomatic.  Continue ferrous sulfate.

## 2024-01-15 NOTE — PROGRESS NOTES
[facility] at [address]  [place]  [id number] [phone]    SNF Rehab: POS 31  Progress Note    ASSESSMENT AND PLAN:  1. Type 2 diabetes mellitus with hyperglycemia, unspecified whether long term insulin use (HCC)  Assessment & Plan:    Lab Results   Component Value Date    HGBA1C 13.8 (H) 2023     Hx of poorly controlled blood sugars  Blood sugars in the am have been ranging 60's - low 100's  Glargine was decreased from 38 units to 30 at HS on 1/10/24 and patient continues to have low BS in am.  Will change administration time of glargine to am and monitor for improvement  Continue snack at HS  Continue accu-checks 2x daily  Endocrine f/u scheduled for 24          2. Acute malignant otitis externa of left ear  Assessment & Plan:  Antibiotics scheduled until 24  Pain controlled with multimodal pain regime.   ID following   Continue to monitor pain and signs of infection         3. Physical deconditioning  Assessment & Plan:  Patient continues to have weakness  Continue PT/OT  Maintain fall precautions       4. Hypertension, essential  Assessment & Plan:  SBP has been running low 100's   A couple am SBP in the 90's   Encouraged patient to increase fluids  Patient not currently on antihypertensives  Ordered orthostatic BP's x3 days morning and afternoon.       5. Acute blood loss anemia (ABLA)  Assessment & Plan:  S/P transfusion inpatient  Hgb has improved since admission to [facility], Hgb currently 10.1 and patient is asymptomatic.  Continue ferrous sulfate.          6. Upper GI bleed  Assessment & Plan:  Hgb has improved since admission to STR (24: Hgb 10.1-- 1/10/24: Hgb 10.3)  Continue Pantoprazole 40 mg BID before meals   No signs of active GI bleed.  Continue to closely monitor for changes (dizziness, black tarry stool, blood in vomit, abdominal cramps, paleness, SOB)        Name:   Milton Ortiz               :  [birth year]              Sex:  Male      HPI:    78 year old patient seen and examined today to follow-up on acute and chronic medical conditions in STR.  Patient admitted to [facility] following hospitalization for acute otitis externa, malignant mastoiditis and osteomyelitis. Antibiotic Ciprofloxacin was initiated in hospital and will be completed on 1/22/24. During STR admission patient has c/o intermittent frontal headaches and pain over his left eye, which patient states has been much better. Patient also has hx of type 2 DM, trigeminal neuralgia and GI bleed with ABLA.   Patient is in no acute distress, denies chest pain, palpitations, SOB, N/V/D.      The following portions of the patient's history were reviewed and updated as appropriate: allergies, current medications, past family history, past medical history, past social history, past surgical history and problem list.    ROS: Review of Systems   Constitutional:  Negative for chills and fever.   HENT:  Positive for ear pain (left).    Respiratory:  Negative for chest tightness, shortness of breath and wheezing.    Cardiovascular:  Negative for chest pain and palpitations.   Gastrointestinal:  Negative for abdominal pain, blood in stool, constipation, diarrhea, nausea and vomiting.   Musculoskeletal:  Positive for gait problem and myalgias.   Neurological:  Positive for dizziness (upon standing occasionally) and weakness. Negative for headaches.   All other systems reviewed and are negative.      Allergies   Allergen Reactions    Glyburide      Rash and facial swelling      Latex     Medical Tape     Metformin     Morphine Hives    Penicillins     Prasugrel Hives    Repaglinide      Diarrhea and joint pain    Sulfa Antibiotics     Clopidogrel Rash       Medications:    Current Outpatient Medications on File Prior to Visit   Medication Sig Dispense Refill    insulin glargine (LANTUS) 100 units/mL subcutaneous injection Inject 30 Units under the skin in the morning AM administration       acetaminophen (TYLENOL) 650 mg CR tablet Take 975 mg by mouth 3 (three) times a day      aspirin 81 MG tablet Take 81 mg by mouth daily      Carboxymethylcellulose Sodium (REFRESH CELLUVISC OP) Apply 1 drop to eye Three times a day      Cholecalciferol (Vitamin D3) 1.25 MG (21017 UT) CAPS Take 5,000 Units by mouth daily      ciprofloxacin (CIPRO) 750 mg tablet Take 750 mg by mouth 2 (two) times a day      diphenhydrAMINE (Benadryl Allergy) 25 mg capsule Take 25 mg by mouth every 6 (six) hours as needed for itching      ferrous sulfate 325 (65 Fe) mg tablet Take 325 mg by mouth daily with breakfast      Melatonin 5 MG TABS Take 5 mg by mouth daily at bedtime      nitroglycerin (NITROSTAT) 0.4 mg SL tablet Place 0.4 mg under the tongue every 5 (five) minutes as needed for chest pain      oxyCODONE (ROXICODONE) 5 immediate release tablet Take 1 tablet (5 mg total) by mouth every 6 (six) hours as needed for severe pain Max Daily Amount: 20 mg      pantoprazole (PROTONIX) 40 mg tablet Take 1 tablet by mouth 2 (two) times a day before meals      pravastatin (PRAVACHOL) 40 mg tablet Take 40 mg by mouth      pregabalin (LYRICA) 75 mg capsule Take 1 capsule (75 mg total) by mouth 3 (three) times a day 30 capsule 0    [DISCONTINUED] insulin detemir (LEVEMIR FLEXTOUCH) 100 Units/mL injection pen Inject 30 Units under the skin daily at bedtime       No current facility-administered medications on file prior to visit.       History:  Past Medical History:   Diagnosis Date    Allergic     Chronic leg pain     Resolved 10/16/2014     Coronary artery disease     Diabetes mellitus (HCC)     Diabetes mellitus type II, uncontrolled     Last assessed 8/10/2014     Diabetes mellitus with neurological manifestation (HCC)     Last assessed 10/2/2014     Type 2 diabetes mellitus with hyperglycemia (HCC)     Last assessed 7/9/2013     Varicose veins of both lower extremities     Last assessed 10/11/2010      Past Surgical History:    Procedure Laterality Date    CORONARY STENT PLACEMENT      2 stents placed    VARICOSE VEIN SURGERY      15 yrs. ago both legs.      Family History   Problem Relation Age of Onset    Diabetes Father     COPD Daughter     Breast cancer additional onset Daughter     Diabetes Mother     Heart disease Mother 60     Social History     Socioeconomic History    Marital status: /Civil Union     Spouse name: Not on file    Number of children: Not on file    Years of education: Not on file    Highest education level: Not on file   Occupational History    Not on file   Tobacco Use    Smoking status: Never    Smokeless tobacco: Never    Tobacco comments:     Former smoker - As per [employer]    Substance and Sexual Activity    Alcohol use: Never    Drug use: Never    Sexual activity: Not Currently     Partners: Female   Other Topics Concern    Not on file   Social History Narrative    Not on file     Social Determinants of Health     Financial Resource Strain: Low Risk  (12/3/2023)    Received from [facility]    Overall Financial Resource Strain (CARDIA)     Difficulty of Paying Living Expenses: Not hard at all   Food Insecurity: No Food Insecurity (12/3/2023)    Received from [facility]    Hunger Vital Sign     Worried About Running Out of Food in the Last Year: Never true     Ran Out of Food in the Last Year: Never true   Transportation Needs: No Transportation Needs (12/3/2023)    Received from [facility]    PRAPARE - Transportation     Lack of Transportation (Medical): No     Lack of Transportation (Non-Medical): No   Physical Activity: Inactive (3/11/2020)    Exercise Vital Sign     Days of Exercise per Week: 0 days     Minutes of Exercise per Session: 0 min   Stress: No Stress Concern Present (3/11/2020)    [employer] - Occupational Stress Questionnaire     Feeling of Stress : Only a little   Social Connections: Moderately  Isolated (3/11/2020)    Social Connection and Isolation Panel [NHANES]     Frequency of Communication with Friends and Family: More than three times a week     Frequency of Social Gatherings with Friends and Family: More than three times a week     Attends Christian Services: Never     Active Member of Clubs or Organizations: No     Attends Club or Organization Meetings: Never     Marital Status:    Intimate Partner Violence: Not At Risk (12/3/2023)    Received from [facility]    Humiliation, Afraid, Rape, and Kick questionnaire     Fear of Current or Ex-Partner: No     Emotionally Abused: No     Physically Abused: No     Sexually Abused: No   Housing Stability: Low Risk  (12/3/2023)    Received from [facility]    Housing Stability Vital Sign     Unable to Pay for Housing in the Last Year: No     Number of Places Lived in the Last Year: 1     Unstable Housing in the Last Year: No     Past Surgical History:   Procedure Laterality Date    CORONARY STENT PLACEMENT      2 stents placed    VARICOSE VEIN SURGERY      15 yrs. ago both legs.        OBJECTIVE:  Vitals:    01/15/24 0935   BP: 96/56   Pulse: 92   Resp: 20   Temp: 97.7 °F (36.5 °C)   SpO2: 96%     There is no height or weight on file to calculate BMI.  Physical Exam  Vitals and nursing note reviewed.   Constitutional:       General: He is not in acute distress.     Appearance: Normal appearance. He is not ill-appearing.   HENT:      Head: Normocephalic and atraumatic.      Right Ear: External ear normal.      Left Ear: External ear normal.      Nose: Nose normal.   Eyes:      General: No scleral icterus.        Right eye: No discharge.         Left eye: No discharge.   Cardiovascular:      Rate and Rhythm: Normal rate and regular rhythm.      Heart sounds: Normal heart sounds. No murmur heard.  Pulmonary:      Effort: Pulmonary effort is normal. No respiratory distress.      Breath sounds: Normal breath sounds. No  wheezing.   Abdominal:      General: Bowel sounds are normal.      Palpations: Abdomen is soft.      Tenderness: There is no abdominal tenderness.   Musculoskeletal:      Cervical back: Normal range of motion.      Right lower leg: No edema.      Left lower leg: No edema.   Skin:     General: Skin is warm and dry.   Neurological:      Mental Status: He is alert.      Motor: Weakness present.      Gait: Gait abnormal.   Psychiatric:         Mood and Affect: Mood normal.         Behavior: Behavior normal.           JENNIFER Wu  Geriatric Medicine  01/15/24

## 2024-01-15 NOTE — ASSESSMENT & PLAN NOTE
SBP has been running low 100's   A couple am SBP in the 90's   Encouraged patient to increase fluids  Patient not currently on antihypertensives  Ordered orthostatic BP's x3 days morning and afternoon.

## 2024-01-17 ENCOUNTER — NURSING HOME VISIT (OUTPATIENT)
Dept: GERIATRICS | Facility: OTHER | Age: 79
End: 2024-01-17
Payer: MEDICARE

## 2024-01-17 DIAGNOSIS — K92.2 UPPER GI BLEED: ICD-10-CM

## 2024-01-17 DIAGNOSIS — E11.65 TYPE 2 DIABETES MELLITUS WITH HYPERGLYCEMIA, WITH LONG-TERM CURRENT USE OF INSULIN (HCC): ICD-10-CM

## 2024-01-17 DIAGNOSIS — N18.2 CKD (CHRONIC KIDNEY DISEASE) STAGE 2, GFR 60-89 ML/MIN: ICD-10-CM

## 2024-01-17 DIAGNOSIS — K26.9 DUODENAL ULCER: Primary | ICD-10-CM

## 2024-01-17 DIAGNOSIS — E87.8 ELECTROLYTE DISTURBANCE: ICD-10-CM

## 2024-01-17 DIAGNOSIS — H61.23 EXCESSIVE CERUMEN IN EAR CANAL, BILATERAL: ICD-10-CM

## 2024-01-17 DIAGNOSIS — D62 ACUTE BLOOD LOSS ANEMIA (ABLA): ICD-10-CM

## 2024-01-17 DIAGNOSIS — Z79.4 TYPE 2 DIABETES MELLITUS WITH HYPERGLYCEMIA, WITH LONG-TERM CURRENT USE OF INSULIN (HCC): ICD-10-CM

## 2024-01-17 DIAGNOSIS — H60.22 ACUTE MALIGNANT OTITIS EXTERNA OF LEFT EAR: ICD-10-CM

## 2024-01-17 PROCEDURE — 99316 NF DSCHRG MGMT 30 MIN+: CPT | Performed by: NURSE PRACTITIONER

## 2024-01-17 NOTE — PROGRESS NOTES
[facility] at [address]  [place] [id number] [phone]     SNF Rehab: POS 31  Discharge Note      ASSESSMENT AND PLAN:  Acute malignant otitis externa of left ear  Assessment & Plan:  Stable  Continue antibiotics as ordered by ID   Continue multimodal pain medication regime  Follow-up with ID as scheduled         Electrolyte disturbance   Assessment & Plan:  Potassium low at 3.4 and Sodium elevated at 147  Potassium repleted   Encourage free water intake          Type 2 diabetes mellitus with hyperglycemia, unspecified whether long term insulin use (HCC)  Assessment & Plan:           Lab Results   Component Value Date     HGBA1C 13.8 (H) 2023   History of poorly controlled glucose levels  Blood sugars trending low in the am and 200-300's in the evenings.  Long acting insulin decreased and time changed to be given in am.   Follow-up with Endocrine as scheduled.     Acute blood loss anemia (ABLA)  Assessment & Plan:  In the setting of above  S/p PRBC transfusion inpatient  Hemoglobin currently improving from 10.1 to now 10.3  Continue ferrous sulfate        Upper GI bleed  Assessment & Plan:  Improved since hospitalization, hemoglobin improved from 9.2 on  to 10.1 on  and is 10.3 today.   Continue pantoprazole 40 mg bid        CKD (chronic kidney disease) stage 2, GFR 60-89 ml/min  Assessment & Plan:  Baseline creatinine 1.1  Creatinine currently 0.95 today  Patient at risk for PAULINO in the setting of ABLA and prolonged antibiotic use  Recommend monitoring BMP and CBC outpatient.         Excessive cerumen in ear canal, bilateral  Assessment & Plan:  Recommend follow-up with ENT for ear cleaning due to recent ear surgery     Name: Milton Ortiz : 1945 Sex: Male      HPI:   78 year old patient seen and examined today, with wife present for discharge planning. He is status post hospitalization for acute otitis externa, malignant mastoiditis and  osteomyelitis. He is on Ciprofloxacin until 01/22/24. Patient complains of occasional frontal headaches that are relieved with multimodal pain regimen including tylenol and OxyIR. He currently denies headache or acute complaint. His other history includes DM2 with hypo/hyperglycemia, trigeminal neuralgia and GI bleed with ABLA.     He received comprehensive rehab services in SNF rehab with an overall improvement in his functional status.  He is ambulating 60 feet with roller walker contact guard assist. He is set up for upper body ADLs and min assist for lower body ADLs and toileting.       The following portions of the patient's history were reviewed and updated as appropriate: allergies, current medications, past family history, past medical history, past social history, past surgical history and problem list.     ROS: Review of Systems   Constitutional: Negative for chills and fever.   HENT: Negative for left ear pain, hearing loss (left side). Negative for mouth sores.   Eyes: Negative for photophobia, discharge, redness, itching and visual disturbance.   Respiratory: Negative for chest tightness and shortness of breath.   Cardiovascular: Negative for chest pain, palpitations and leg swelling.   Gastrointestinal: Negative for diarrhea   Genitourinary: Negative for difficulty urinating and dysuria.   Musculoskeletal: Positive for myalgias (d/t PT).   Skin: Negative for rash  Neurological: Positive for headaches. Negative for dizziness.   Intermittent headaches   All other systems reviewed and are negative.              Allergies   Allergen Reactions    Glyburide         Rash and facial swelling       Latex      Medical Tape      Metformin      Morphine Hives    Penicillins      Prasugrel Hives    Repaglinide         Diarrhea and joint pain    Sulfa Antibiotics      Clopidogrel Rash         Medications:          Current Outpatient Medications on File Prior to Visit   Medication Sig Dispense Refill    acetaminophen  (TYLENOL) 650 mg CR tablet Take 975 mg by mouth 3 (three) times a day        aspirin 81 MG tablet Take 81 mg by mouth daily        Carboxymethylcellulose Sodium (REFRESH CELLUVISC OP) Apply 1 drop to eye Three times a day        Cholecalciferol (Vitamin D3) 1.25 MG (90957 UT) CAPS Take 5,000 Units by mouth daily        ciprofloxacin (CIPRO) 750 mg tablet Take 750 mg by mouth 2 (two) times a day        diphenhydrAMINE (Benadryl Allergy) 25 mg capsule Take 25 mg by mouth every 6 (six) hours as needed for itching        ferrous sulfate 325 (65 Fe) mg tablet Take 325 mg by mouth daily with breakfast        insulin detemir (LEVEMIR FLEXTOUCH) 100 Units/mL injection pen Inject 38 Units under the skin daily at bedtime        Melatonin 5 MG TABS Take 5 mg by mouth daily at bedtime        nitroglycerin (NITROSTAT) 0.4 mg SL tablet Place 0.4 mg under the tongue every 5 (five) minutes as needed for chest pain        oxyCODONE (ROXICODONE) 5 immediate release tablet Take 1 tablet (5 mg total) by mouth every 6 (six) hours as needed for severe pain Max Daily Amount: 20 mg        pantoprazole (PROTONIX) 40 mg tablet Take 1 tablet by mouth 2 (two) times a day before meals        pravastatin (PRAVACHOL) 40 mg tablet Take 40 mg by mouth        pregabalin (LYRICA) 75 mg capsule Take 1 capsule (75 mg total) by mouth 3 (three) times a day 30 capsule 0      No current facility-administered medications on file prior to visit.         History:       Past Medical History:   Diagnosis Date    Allergic      Chronic leg pain       Resolved 10/16/2014     Coronary artery disease      Diabetes mellitus (HCC)      Diabetes mellitus type II, uncontrolled       Last assessed 8/10/2014     Diabetes mellitus with neurological manifestation (HCC)       Last assessed 10/2/2014     Type 2 diabetes mellitus with hyperglycemia (HCC)       Last assessed 7/9/2013     Varicose veins of both lower extremities       Last assessed 10/11/2010             Past  Surgical History:   Procedure Laterality Date    CORONARY STENT PLACEMENT         2 stents placed    VARICOSE VEIN SURGERY         15 yrs. ago both legs.             Family History   Problem Relation Age of Onset    Diabetes Father      COPD Daughter      Breast cancer additional onset Daughter      Diabetes Mother      Heart disease Mother 60      Social History            Socioeconomic History    Marital status: /Civil Union       Spouse name: Not on file    Number of children: Not on file    Years of education: Not on file    Highest education level: Not on file   Occupational History    Not on file   Tobacco Use    Smoking status: Never    Smokeless tobacco: Never    Tobacco comments:       Former smoker - As per [employer]    Substance and Sexual Activity    Alcohol use: Never    Drug use: Never    Sexual activity: Not Currently       Partners: Female   Other Topics Concern    Not on file   Social History Narrative    Not on file      Social Determinants of Health           Financial Resource Strain: Low Risk (12/3/2023)     Received from [facility]     Overall Financial Resource Strain (CARDIA)      Difficulty of Paying Living Expenses: Not hard at all   Food Insecurity: No Food Insecurity (12/3/2023)     Received from [facility]     Hunger Vital Sign      Worried About Running Out of Food in the Last Year: Never true      Ran Out of Food in the Last Year: Never true   Transportation Needs: No Transportation Needs (12/3/2023)     Received from [facility]     PRAPARE - Transportation      Lack of Transportation (Medical): No      Lack of Transportation (Non-Medical): No   Physical Activity: Inactive (3/11/2020)     Exercise Vital Sign      Days of Exercise per Week: 0 days      Minutes of Exercise per Session: 0 min   Stress: No Stress Concern Present (3/11/2020)     [employer] - Occupational Stress Questionnaire       Feeling of Stress : Only a little   Social Connections: Moderately Isolated (3/11/2020)     Social Connection and Isolation Panel [NHANES]      Frequency of Communication with Friends and Family: More than three times a week      Frequency of Social Gatherings with Friends and Family: More than three times a week      Attends Christianity Services: Never      Active Member of Clubs or Organizations: No      Attends Club or Organization Meetings: Never      Marital Status:    Intimate Partner Violence: Not At Risk (12/3/2023)     Received from [facility]     Humiliation, Afraid, Rape, and Kick questionnaire      Fear of Current or Ex-Partner: No      Emotionally Abused: No      Physically Abused: No      Sexually Abused: No   Housing Stability: Low Risk (12/3/2023)     Received from [facility]     Housing Stability Vital Sign      Unable to Pay for Housing in the Last Year: No      Number of Places Lived in the Last Year: 1      Unstable Housing in the Last Year: No            Past Surgical History:   Procedure Laterality Date    CORONARY STENT PLACEMENT         2 stents placed    VARICOSE VEIN SURGERY         15 yrs. ago both legs.          OBJECTIVE:  Vital Signs:  /65, Temp 97.9, HR 82, RR 18, SpO2 95% RA, Wgt: 173 lbs       Physical Exam  Constitutional:   General: He is not in acute distress.  Appearance: Normal appearance.   HENT:   Head: Normocephalic and atraumatic.   Right Ear: There is impacted cerumen.   Left Ear: There is impacted cerumen.   Mouth/Throat:   Mouth: Mucous membranes are moist.   Pharynx: Oropharynx is clear.   Eyes:   General:   Right eye: No discharge.   Left eye: No discharge.   Extraocular Movements: Extraocular movements intact.   Cardiovascular:   Rate and Rhythm: Normal rate and regular rhythm.   Heart sounds: Normal heart sounds. No murmur heard.  Pulmonary:   Effort: Pulmonary effort is normal. No respiratory distress.   Breath sounds:  Normal breath sounds.   Abdominal:   Tenderness: There is no abdominal tenderness.   Musculoskeletal:   Right lower leg: No edema.   Left lower leg: No edema.   Skin:  General: Skin is warm and dry.   Neurological:   General: No focal deficit present.   Mental Status: He is alert and oriented to person, place, and time.   Gait: Gait abnormal.   Psychiatric:   Mood and Affect: Mood normal.   Behavior: Behavior normal.            Labs & Imaging Reviewed in facility EMR.   Discussion with patient/family and further instructions:  -Fall precautions  -Aspiration precautions  -Bleeding precautions  -Monitor for signs/symptoms of infection  -Medication list was reviewed and signed  -DME form was completed     Follow-up Recommendations: Please follow-up with your primary care physician within 7-10 days of discharge to review medication changes and current status.      Problem List Follow-up Recommendations:  I have spent 40 minutes with Patient /Family today in which greater than 50% of this time was spent in counseling/coordination of care.         JENNIFER Baum  Geriatric Medicine

## 2024-01-18 RX ORDER — SENNOSIDES A AND B 8.6 MG/1
1 TABLET, FILM COATED ORAL DAILY
COMMUNITY
End: 2024-01-27

## 2024-01-18 RX ORDER — POLYETHYLENE GLYCOL 3350 17 G/17G
17 POWDER, FOR SOLUTION ORAL DAILY
COMMUNITY
End: 2024-01-27

## 2024-01-18 RX ORDER — LIDOCAINE 4 G/G
1 PATCH TOPICAL DAILY
COMMUNITY
End: 2024-02-02

## 2024-01-18 RX ORDER — PANTOPRAZOLE SODIUM 40 MG/1
40 TABLET, DELAYED RELEASE ORAL
Qty: 30 TABLET | Refills: 0 | Status: SHIPPED | OUTPATIENT
Start: 2024-01-18

## 2024-01-19 DIAGNOSIS — E78.5 HYPERLIPIDEMIA, UNSPECIFIED HYPERLIPIDEMIA TYPE: Primary | ICD-10-CM

## 2024-01-19 RX ORDER — PRAVASTATIN SODIUM 40 MG
40 TABLET ORAL
Qty: 90 TABLET | Refills: 0 | Status: SHIPPED | OUTPATIENT
Start: 2024-01-19

## 2024-01-23 ENCOUNTER — HOSPITAL ENCOUNTER (EMERGENCY)
Facility: HOSPITAL | Age: 79
Discharge: NURSING, REHAB OR LONG-TERM CARE | End: 2024-01-23
Attending: EMERGENCY MEDICINE
Payer: MEDICARE

## 2024-01-23 ENCOUNTER — APPOINTMENT (EMERGENCY)
Dept: CT IMAGING | Facility: HOSPITAL | Age: 79
End: 2024-01-23
Payer: MEDICARE

## 2024-01-23 ENCOUNTER — TELEPHONE (OUTPATIENT)
Dept: OTHER | Facility: OTHER | Age: 79
End: 2024-01-23

## 2024-01-23 VITALS
SYSTOLIC BLOOD PRESSURE: 119 MMHG | OXYGEN SATURATION: 94 % | HEART RATE: 64 BPM | DIASTOLIC BLOOD PRESSURE: 60 MMHG | RESPIRATION RATE: 18 BRPM | TEMPERATURE: 98.3 F

## 2024-01-23 DIAGNOSIS — M86.18 OTHER ACUTE OSTEOMYELITIS, OTHER SITE (HCC): ICD-10-CM

## 2024-01-23 DIAGNOSIS — R26.2 AMBULATORY DYSFUNCTION: ICD-10-CM

## 2024-01-23 DIAGNOSIS — S20.211A CHEST WALL CONTUSION, RIGHT, INITIAL ENCOUNTER: Primary | ICD-10-CM

## 2024-01-23 LAB — GLUCOSE SERPL-MCNC: 203 MG/DL (ref 65–140)

## 2024-01-23 PROCEDURE — 93005 ELECTROCARDIOGRAM TRACING: CPT

## 2024-01-23 PROCEDURE — 82948 REAGENT STRIP/BLOOD GLUCOSE: CPT

## 2024-01-23 PROCEDURE — G1004 CDSM NDSC: HCPCS

## 2024-01-23 PROCEDURE — 71250 CT THORAX DX C-: CPT

## 2024-01-23 PROCEDURE — 99284 EMERGENCY DEPT VISIT MOD MDM: CPT

## 2024-01-23 RX ORDER — LIDOCAINE 50 MG/G
1 PATCH TOPICAL ONCE
Status: DISCONTINUED | OUTPATIENT
Start: 2024-01-23 | End: 2024-01-23 | Stop reason: HOSPADM

## 2024-01-23 RX ORDER — PREGABALIN 75 MG/1
75 CAPSULE ORAL 3 TIMES DAILY
Qty: 30 CAPSULE | Refills: 0 | Status: SHIPPED | OUTPATIENT
Start: 2024-01-23 | End: 2024-01-31 | Stop reason: SDUPTHER

## 2024-01-23 RX ORDER — LIDOCAINE 50 MG/G
1 PATCH TOPICAL EVERY 24 HOURS
Qty: 15 PATCH | Refills: 0 | Status: SHIPPED | OUTPATIENT
Start: 2024-01-23 | End: 2024-02-02

## 2024-01-23 RX ORDER — OXYCODONE HYDROCHLORIDE 5 MG/1
5 TABLET ORAL ONCE
Status: COMPLETED | OUTPATIENT
Start: 2024-01-23 | End: 2024-01-23

## 2024-01-23 RX ORDER — KETOROLAC TROMETHAMINE 30 MG/ML
30 INJECTION, SOLUTION INTRAMUSCULAR; INTRAVENOUS ONCE
Status: DISCONTINUED | OUTPATIENT
Start: 2024-01-23 | End: 2024-01-23 | Stop reason: HOSPADM

## 2024-01-23 RX ORDER — ACETAMINOPHEN 325 MG/1
975 TABLET ORAL ONCE
Status: COMPLETED | OUTPATIENT
Start: 2024-01-23 | End: 2024-01-23

## 2024-01-23 RX ORDER — FENTANYL CITRATE 50 UG/ML
50 INJECTION, SOLUTION INTRAMUSCULAR; INTRAVENOUS ONCE
Status: DISCONTINUED | OUTPATIENT
Start: 2024-01-23 | End: 2024-01-23

## 2024-01-23 RX ADMIN — OXYCODONE HYDROCHLORIDE 5 MG: 5 TABLET ORAL at 12:58

## 2024-01-23 RX ADMIN — ACETAMINOPHEN 975 MG: 325 TABLET, FILM COATED ORAL at 09:47

## 2024-01-23 RX ADMIN — LIDOCAINE 5% 1 PATCH: 700 PATCH TOPICAL at 07:09

## 2024-01-23 NOTE — ED NOTES
Transport set up for patient at this time, awaiting p/u time.      Mary Kay Elena, MARIO  01/23/24 3139

## 2024-01-23 NOTE — ED PROVIDER NOTES
History  Chief Complaint   Patient presents with    Fall     Pt presents to the ED S/P fall, pt was ambulating with walker when he dropped his cell phone and fell to the side. Reports injury to the right ribs after hitting the end table. Denies head injury. Denies LOC. C/o of right rib pain. Bruising noted to the right side.      (Milton Ortiz) Milton Ortiz is a 78 y.o. male     They presented to the emergency department on January 23, 2024. Patient presents with:  Fall: Pt presents to the ED S/P fall, pt was ambulating with walker when he dropped his cell phone and fell to the side. Reports injury to the right ribs after hitting the end table. Denies head injury. Denies LOC. C/o of right rib pain. Bruising noted to the right side.       The patient states that he was up walking using his walker, dropped his cell phone, and when he attempted to pick it up lost his balance and fell striking his and table on the right side of his chest.  Patient experienced immediate pain at that time, he was able to get up and took 2 Tylenol which have somewhat improved his pain however due to the severity he came to the emergency department for evaluation. Patient denies loss of consciousness, striking his head, other injuries, difficulty breathing, or any other complaint at this time.              Prior to Admission Medications   Prescriptions Last Dose Informant Patient Reported? Taking?   Carboxymethylcellulose Sodium (REFRESH CELLUVISC OP)   Yes No   Sig: Apply 1 drop to eye Three times a day   Cholecalciferol (Vitamin D3) 1.25 MG (81892 UT) CAPS   Yes No   Sig: Take 10,000 Units by mouth daily   Lidocaine 4 % PTCH   Yes No   Sig: Apply 1 patch topically daily Apply to left knee daily on in am, off at hs   Melatonin 5 MG TABS   Yes No   Sig: Take 5 mg by mouth daily at bedtime   acetaminophen (TYLENOL) 650 mg CR tablet   Yes No   Sig: Take 975 mg by mouth 3 (three) times a day   aspirin 81 MG tablet   Yes No   Sig: Take 81 mg by  mouth daily   ciprofloxacin (CIPRO) 750 mg tablet   Yes No   Sig: Take 750 mg by mouth 2 (two) times a day   ferrous sulfate 325 (65 Fe) mg tablet   Yes No   Sig: Take 325 mg by mouth daily with breakfast Once daily in the am and once daily at 1400 while taking Ciprofloxacin then return to once daily in the am.   insulin glargine (LANTUS) 100 units/mL subcutaneous injection   Yes No   Sig: Inject 30 Units under the skin in the morning AM administration   mineral oil-hydrophilic petrolatum (AQUAPHOR) ointment   Yes No   Sig: Apply 1 Application topically 3 (three) times a day Apply to face   nitroglycerin (NITROSTAT) 0.4 mg SL tablet   Yes No   Sig: Place 0.4 mg under the tongue every 5 (five) minutes as needed for chest pain   pantoprazole (PROTONIX) 40 mg tablet   No No   Sig: Take 1 tablet (40 mg total) by mouth 2 (two) times a day before meals   polyethylene glycol (MIRALAX) 17 g packet   Yes No   Sig: Take 17 g by mouth daily   pravastatin (PRAVACHOL) 40 mg tablet   No No   Sig: TAKE 1 TABLET BY MOUTH EVERY NIGHT AT BEDTIME   pregabalin (LYRICA) 75 mg capsule   No No   Sig: Take 1 capsule (75 mg total) by mouth 3 (three) times a day   senna (SENOKOT) 8.6 MG tablet   Yes No   Sig: Take 1 tablet by mouth daily      Facility-Administered Medications: None       Past Medical History:   Diagnosis Date    Allergic     Chronic leg pain     Resolved 10/16/2014     Coronary artery disease     Diabetes mellitus (HCC)     Diabetes mellitus type II, uncontrolled     Last assessed 8/10/2014     Diabetes mellitus with neurological manifestation (HCC)     Last assessed 10/2/2014     Type 2 diabetes mellitus with hyperglycemia (HCC)     Last assessed 7/9/2013     Varicose veins of both lower extremities     Last assessed 10/11/2010        Past Surgical History:   Procedure Laterality Date    CORONARY STENT PLACEMENT      2 stents placed    VARICOSE VEIN SURGERY      15 yrs. ago both legs.        Family History   Problem  Relation Age of Onset    Diabetes Father     COPD Daughter     Breast cancer additional onset Daughter     Diabetes Mother     Heart disease Mother 60     I have reviewed and agree with the history as documented.    E-Cigarette/Vaping     E-Cigarette/Vaping Substances     Social History     Tobacco Use    Smoking status: Never    Smokeless tobacco: Never    Tobacco comments:     Former smoker - As per [employer]    Substance Use Topics    Alcohol use: Never    Drug use: Never        Review of Systems   Constitutional:  Negative for chills and fever.   HENT:  Negative for ear pain and sore throat.    Eyes:  Negative for pain and visual disturbance.   Respiratory:  Negative for cough and shortness of breath.    Cardiovascular:  Positive for chest pain. Negative for palpitations.   Gastrointestinal:  Negative for abdominal pain and vomiting.   Genitourinary:  Negative for dysuria and hematuria.   Musculoskeletal:  Negative for arthralgias and back pain.   Skin:  Negative for color change and rash.   Neurological:  Negative for seizures and syncope.   All other systems reviewed and are negative.      Physical Exam  ED Triage Vitals [01/23/24 0636]   Temperature Pulse Respirations Blood Pressure SpO2   98.3 °F (36.8 °C) 82 (S) (!) 24 152/72 94 %      Temp Source Heart Rate Source Patient Position - Orthostatic VS BP Location FiO2 (%)   Oral Monitor Sitting Right arm --      Pain Score       --             Orthostatic Vital Signs  Vitals:    01/23/24 0636 01/23/24 0730 01/23/24 1100   BP: 152/72 135/74 134/65   Pulse: 82 79 69   Patient Position - Orthostatic VS: Sitting  Lying       Physical Exam  Vitals and nursing note reviewed.   Constitutional:       General: He is not in acute distress.     Appearance: Normal appearance.   HENT:      Head: Normocephalic and atraumatic.      Right Ear: External ear normal.      Left Ear: External ear normal.      Nose: Nose normal.      Mouth/Throat:      Mouth: Mucous membranes are  moist.   Eyes:      Conjunctiva/sclera: Conjunctivae normal.   Cardiovascular:      Rate and Rhythm: Normal rate and regular rhythm.   Pulmonary:      Effort: Pulmonary effort is normal. No respiratory distress.      Breath sounds: Normal breath sounds.   Chest:      Chest wall: Tenderness (Tenderness to palpation overlying the fourth and fifth right lateral ribs with 7 x 3 cm area of ecchymosis) present.   Abdominal:      General: Abdomen is flat. Bowel sounds are normal.      Tenderness: There is no abdominal tenderness. There is no guarding or rebound.   Musculoskeletal:         General: Normal range of motion.      Cervical back: Normal range of motion.   Skin:     General: Skin is warm and dry.      Capillary Refill: Capillary refill takes less than 2 seconds.   Neurological:      Mental Status: He is alert. Mental status is at baseline.   Psychiatric:         Mood and Affect: Mood normal.         ED Medications  Medications   lidocaine (LIDODERM) 5 % patch 1 patch (1 patch Topical Medication Applied 1/23/24 0709)   ketorolac (TORADOL) injection 30 mg (30 mg Intramuscular Not Given 1/23/24 0709)   acetaminophen (TYLENOL) tablet 975 mg (975 mg Oral Given 1/23/24 0947)   oxyCODONE (ROXICODONE) IR tablet 5 mg (5 mg Oral Given 1/23/24 1258)       Diagnostic Studies  Results Reviewed       Procedure Component Value Units Date/Time    Fingerstick Glucose (POCT) [[id number]]  (Abnormal) Collected: 01/23/24 0703    Lab Status: Final result Updated: 01/23/24 0704     POC Glucose 203 mg/dl                    CT follow up   Final Result by Shade Bender MD (01/23 1050)      No evidence of rib fracture. Right lateral chest wall subcutaneous contusion.      Dependent atelectasis in the lungs, with minimal bilateral pleural effusions.         Workstation performed: [id number]         CT chest without contrast   Final Result by Shade Bender MD (01/23 0723)      Very limited study. Essentially nondiagnostic  evaluation of the ribs due to motion artifact. If clinical concern remains, consider repeat examination      Dependent atelectasis in the lungs, with probable trace bilateral pleural effusions.         Workstation performed: [id number]               Procedures  ECG 12 Lead Documentation Only    Date/Time: 1/23/2024 7:25 AM    Performed by: Anthony Camacho MD  Authorized by: Anthony Camacho MD    Indications / Diagnosis:  Fall  ECG reviewed by me, the ED Provider: yes    Patient location:  ED  Previous ECG:     Previous ECG:  Compared to current    Similarity:  No change  Interpretation:     Interpretation: normal    Rate:     ECG rate:  82    ECG rate assessment: normal    Rhythm:     Rhythm: sinus rhythm    Ectopy:     Ectopy: none    QRS:     QRS axis:  Normal    QRS intervals:  Normal  Conduction:     Conduction: normal    ST segments:     ST segments:  Normal  T waves:     T waves: normal    Comments:      Normal sinus rhythm at 82 bpm, no PACs, no PVCs, no acute ischemic changes.        ED Course  ED Course as of 01/23/24 1454   Tue Jan 23, 2024   0707 Patient declined fentanyl, stating that he would now like an IV at this time, opting for Toradol as well as Lidoderm patch.   0727 CT chest without contrast  CT without identifiable abnormality is secondary due to motion artifact.  Discussed results with patient, agreeable with obtaining repeat imaging at this time to evaluate for fracture.   1102 Repeat CT showed no evidence of rib fracture, subcutaneous contusion appreciated.  Discussed results with family, however patient continues with difficulty with ambulation, family is requesting physical therapy evaluation.  They do not feel comfortable at this time due to patient's current condition feeling safe taking him home.  Will discuss for possible SNF placement.   1203 Attempted to contact SNF ([phone]) multiple times, however unable to.  Left message.  Will obtain basic blood work and reach out to  [facility] for admission.   1230 Was contacted by SNF, will attempt for placement.                                       Medical Decision Making  78-year-old male presenting with fall and complaints of right-sided chest wall pain, notably tender as well as with ecchymosis of the right lateral chest.  Concern for possible rib fracture and traumatic setting.  Will obtain CT imaging to evaluate for fracture.  Offered pain control with fentanyl, however patient declined requesting either Tylenol or Lidoderm.  Radiographic imaging reported no acute osseous abnormalities or other acute concerning process.  Discussed results with family as well as patient at bedside.  Family noted patient continues to have difficulty with ambulation and is requesting physical therapy.  Discussed patient's case with SNF who was able to create arrangements for patient to be placed at [place].  Patient appears well, nontoxic agrees with plan of care at this time.  In light of this patient would benefit from outpatient event and follow-up.    Amount and/or Complexity of Data Reviewed  Labs: ordered.  Radiology: ordered. Decision-making details documented in ED Course.    Risk  OTC drugs.  Prescription drug management.          Disposition  Final diagnoses:   Chest wall contusion, right, initial encounter   Ambulatory dysfunction     Time reflects when diagnosis was documented in both MDM as applicable and the Disposition within this note       Time User Action Codes Description Comment    1/23/2024 10:52 AM Anthony Camacho Add [S20.211A] Chest wall contusion, right, initial encounter     1/23/2024 10:57 AM Anthony Camacho Add [R26.2] Ambulatory dysfunction           ED Disposition       ED Disposition   Discharge    Condition   Stable    Date/Time   Tue Jan 23, 2024 12:41 PM    Comment   Milton Ortiz discharge to home/self care.                   Follow-up Information       Follow up With Specialties Details Why Contact Info    Shade JACOBSEN  DO [facility]   [address] [place] [address] [address]  [facility] [address]  [phone]              Patient's Medications   Discharge Prescriptions    LIDOCAINE (LIDODERM) 5 %    Apply 1 patch topically over 12 hours every 24 hours Remove & Discard patch within 12 hours or as directed by MD       Start Date: 1/23/2024 End Date: --       Order Dose: 1 patch       Quantity: 15 patch    Refills: 0     No discharge procedures on file.    PDMP Review         Value Time User    PDMP Reviewed  Yes 12/29/2023  6:14 PM Sherice Denney DO             ED Provider  Attending physically available and evaluated Milton Ortiz. I managed the patient along with the ED Attending.    Electronically Signed by           Anthony Camacho MD  01/23/24 4664       Anthony Camacho MD  01/23/24 3679       Anthony Camacho MD  01/23/24 4684

## 2024-01-24 ENCOUNTER — NURSING HOME VISIT (OUTPATIENT)
Dept: GERIATRICS | Facility: OTHER | Age: 79
End: 2024-01-24
Payer: MEDICARE

## 2024-01-24 VITALS
WEIGHT: 207.8 LBS | SYSTOLIC BLOOD PRESSURE: 140 MMHG | RESPIRATION RATE: 18 BRPM | BODY MASS INDEX: 30.69 KG/M2 | DIASTOLIC BLOOD PRESSURE: 76 MMHG | OXYGEN SATURATION: 98 % | HEART RATE: 73 BPM | TEMPERATURE: 97.8 F

## 2024-01-24 DIAGNOSIS — E11.65 TYPE 2 DIABETES MELLITUS WITH HYPERGLYCEMIA, WITH LONG-TERM CURRENT USE OF INSULIN (HCC): ICD-10-CM

## 2024-01-24 DIAGNOSIS — R53.81 PHYSICAL DECONDITIONING: ICD-10-CM

## 2024-01-24 DIAGNOSIS — S20.211S CHEST WALL CONTUSION, RIGHT, SEQUELA: Primary | ICD-10-CM

## 2024-01-24 DIAGNOSIS — Z79.4 TYPE 2 DIABETES MELLITUS WITH HYPERGLYCEMIA, WITH LONG-TERM CURRENT USE OF INSULIN (HCC): ICD-10-CM

## 2024-01-24 DIAGNOSIS — R26.2 AMBULATORY DYSFUNCTION: ICD-10-CM

## 2024-01-24 DIAGNOSIS — D62 ACUTE BLOOD LOSS ANEMIA (ABLA): ICD-10-CM

## 2024-01-24 DIAGNOSIS — M86.9 OSTEOMYELITIS OF SKULL (HCC): ICD-10-CM

## 2024-01-24 DIAGNOSIS — I25.10 CORONARY ARTERY DISEASE INVOLVING NATIVE CORONARY ARTERY OF NATIVE HEART WITHOUT ANGINA PECTORIS: ICD-10-CM

## 2024-01-24 DIAGNOSIS — H60.22 ACUTE MALIGNANT OTITIS EXTERNA OF LEFT EAR: ICD-10-CM

## 2024-01-24 PROCEDURE — 99306 1ST NF CARE HIGH MDM 50: CPT | Performed by: FAMILY MEDICINE

## 2024-01-24 NOTE — TELEPHONE ENCOUNTER
[phone]/Karma MARTIN from [facility] /Rx request for Lyrica 75 mg 3 times a day .      VIA TT

## 2024-01-24 NOTE — PROGRESS NOTES
[facility]  [address]  [place] [address]  [facility]  History and Physical    NAME: Milton Ortiz  AGE: 78 y.o. SEX: male [phone]    DATE OF ENCOUNTER: 1/24/24    Code status:   full code    Assessment and Plan     Chest wall contusion, right, sequela  S/p fall, hitting his right-sided chest to a table.  No rib fracture.  Right lateral chest wall subcutaneous contusion noticed on CT chest 1/23/2024.   Will increase Tylenol from 650mg to 1000mg TID. Robaxin low-dose added today and titrate up as tolerated.  Continue Lidocaine patch daily    Type 2 diabetes mellitus with hyperglycemia (HCC)  Lab Results   Component Value Date    HGBA1C 13.8 (H) 11/23/2023   Inadequate controlled DM  Per chart review, [facility], but was d/c to [facility] with order for glargine at bedtime.  Monitor FG and HS to adjust the dose and time of administration AM vs HS    Acute blood loss anemia (ABLA)  Required transfusion while in the hospital  Recent Hgb 10.1  Continue to monitor CBC    CAD (coronary artery disease)  S/p AWAIS x2 in 2017  Currently no CP or SOB  Continue ASA and statin    Physical deconditioning  Debility following hospitalization for severe illnesses  OT/PT/nutrition support  Optimize chronic medical conditions    Ambulatory dysfunction  High risk of recurrent falls given age, Hx of fall, comorbidities  OT/PT  Fall precautions    Acute malignant otitis externa of left ear  Tissue cultures positive Pseudomonas  ID managing antibiotics- 6-week course of cipro 750mg BID through 1/28/24. Recent ID office visit 1/11/24, note reviewed  Monitor vitals, fever. Trenc CBC.    All medications and routine orders were reviewed and updated as needed.    Plan discussed with: Patient and wife. Coordination of care with nursing, OT/PT, SW.    Chief Complaint     Seen for admission at Nursing Facility    History of Present Illness     78 y.o. male who is seen  today, following ED visit at [facility] yesterday 1/23/2024 after a fall at [facility]. Per record review, he was hospitalized for acute otitis externa, malignant mastoiditis and osteomyelitis s/p surgery. He was doing rehab at [facility] where he fell sustaining a right lateral chest wall subcutaneous contusion. He was sent to [facility] for [facility].   He is laying in bed, c/o right chest pain increased with movement. Denies HA, dizziness, CP, SOB, or palpitations. Had BM yesterday.  Reports no pain or discharge from left ear, but decreased hearing.     HISTORY:  Past Medical History:   Diagnosis Date    Allergic     Chronic leg pain     Resolved 10/16/2014     Coronary artery disease     Diabetes mellitus (HCC)     Diabetes mellitus type II, uncontrolled     Last assessed 8/10/2014     Diabetes mellitus with neurological manifestation (HCC)     Last assessed 10/2/2014     Type 2 diabetes mellitus with hyperglycemia (HCC)     Last assessed 7/9/2013     Varicose veins of both lower extremities     Last assessed 10/11/2010      Family History   Problem Relation Age of Onset    Diabetes Father     COPD Daughter     Breast cancer additional onset Daughter     Diabetes Mother     Heart disease Mother 60     Social History     Socioeconomic History    Marital status: /Civil Union     Spouse name: None    Number of children: None    Years of education: None    Highest education level: None   Occupational History    None   Tobacco Use    Smoking status: Never    Smokeless tobacco: Never    Tobacco comments:     Former smoker - As per [employer]    Substance and Sexual Activity    Alcohol use: Never    Drug use: Never    Sexual activity: Not Currently     Partners: Female   Other Topics Concern    None   Social History Narrative    None     Social Determinants of Health     Financial Resource Strain: Low Risk  (12/3/2023)    Received from [facility]    Overall Financial Resource Strain (CARDIA)      Difficulty of Paying Living Expenses: Not hard at all   Food Insecurity: No Food Insecurity (12/3/2023)    Received from [facility]    Hunger Vital Sign     Worried About Running Out of Food in the Last Year: Never true     Ran Out of Food in the Last Year: Never true   Transportation Needs: No Transportation Needs (12/3/2023)    Received from [facility]    PRAPARE - Transportation     Lack of Transportation (Medical): No     Lack of Transportation (Non-Medical): No   Physical Activity: Inactive (3/11/2020)    Exercise Vital Sign     Days of Exercise per Week: 0 days     Minutes of Exercise per Session: 0 min   Stress: No Stress Concern Present (3/11/2020)    [place] [employer] - Occupational Stress Questionnaire     Feeling of Stress : Only a little   Social Connections: Moderately Isolated (3/11/2020)    Social Connection and Isolation Panel [NHANES]     Frequency of Communication with Friends and Family: More than three times a week     Frequency of Social Gatherings with Friends and Family: More than three times a week     Attends Holiness Services: Never     Active Member of Clubs or Organizations: No     Attends Club or Organization Meetings: Never     Marital Status:    Intimate Partner Violence: Not At Risk (12/3/2023)    Received from [facility]    Humiliation, Afraid, Rape, and Kick questionnaire     Fear of Current or Ex-Partner: No     Emotionally Abused: No     Physically Abused: No     Sexually Abused: No   Housing Stability: Low Risk  (12/3/2023)    Received from [facility]    Housing Stability Vital Sign     Unable to Pay for Housing in the Last Year: No     Number of Places Lived in the Last Year: 1     Unstable Housing in the Last Year: No       Allergies:  Allergies   Allergen Reactions    Glyburide      Rash and facial swelling      Latex     Medical Tape     Metformin     Morphine Hives     Penicillins     Prasugrel Hives    Repaglinide      Diarrhea and joint pain    Sulfa Antibiotics     Clopidogrel Rash       Review of Systems     Review of Systems   Cardiovascular:  Positive for chest pain (right lateral 2/2 chest wall subcutaneous contusion).   Gastrointestinal:  Negative for constipation.   Musculoskeletal:  Positive for myalgias.   All other systems reviewed and are negative.    Medications and orders     All medications reviewed and updated in snf EMR.    Objective     /76   Pulse 73   Temp 97.8 °F (36.6 °C)   Resp 18   Wt 94.3 kg (207 lb 12.8 oz)   SpO2 98%   BMI 30.69 kg/m²     Physical Exam  Vitals and nursing note reviewed.   Constitutional:       General: He is not in acute distress.  HENT:      Head: Normocephalic.      Right Ear: Ear canal and external ear normal.      Left Ear: Ear canal and external ear normal.      Nose: Nose normal.      Mouth/Throat:      Mouth: Mucous membranes are moist.   Cardiovascular:      Rate and Rhythm: Normal rate and regular rhythm.      Pulses: Normal pulses.      Heart sounds: No murmur heard.  Pulmonary:      Effort: Pulmonary effort is normal.      Comments: Diminished breath sounds b/l   Abdominal:      General: Bowel sounds are normal. There is no distension.      Tenderness: There is no abdominal tenderness.   Musculoskeletal:      Right lower leg: No edema.      Left lower leg: No edema.   Skin:     Findings: Bruising (ecchymosis lateral right chest, scattered on left arm and lateral right lower leg) present.   Neurological:      Mental Status: He is alert and oriented to person, place, and time.   Psychiatric:         Behavior: Behavior normal.       Pertinent Laboratory/Diagnostic Studies:   The following labs/studies were reviewed please see chart or hospital paperwork for details.    Labs & Imaging:  CT chest 1/23/24 showed no evidence of rib fracture. Right lateral chest wall subcutaneous contusion.   Dependent  atelectasis in the lungs, with minimal bilateral pleural effusions.    I have spent 50 minutes with Patient and family today including taking history from family, patient, review of records, charting, and counseling regarding diagnosis and management of conditions.    Arianna Stafford MD

## 2024-01-24 NOTE — ASSESSMENT & PLAN NOTE
S/p fall, hitting his right-sided chest to a table.  No rib fracture.  Right lateral chest wall subcutaneous contusion noticed on CT chest 1/23/2024.   Will increase Tylenol from 650mg to 1000mg TID. Robaxin low-dose added today and titrate up as tolerated.  Continue Lidocaine patch daily

## 2024-01-24 NOTE — TELEPHONE ENCOUNTER
[phone]/Karma MARTIN from Reporting patient blood sugar 505/ Patient Milton Ortiz  1945      VIA TT

## 2024-01-25 NOTE — ASSESSMENT & PLAN NOTE
Tissue cultures positive Pseudomonas  ID managing antibiotics- 6-week course of cipro 750mg BID through 1/28/24. Recent ID office visit 1/11/24, note reviewed  Monitor vitals, fever. Trenc CBC.

## 2024-01-25 NOTE — ASSESSMENT & PLAN NOTE
Lab Results   Component Value Date    HGBA1C 13.8 (H) 11/23/2023   Inadequate controlled DM  Per chart review, glargine was changed to AM at [facility], but was d/c to [facility] with order for glargine at bedtime.  Monitor FG and HS to adjust the dose and time of administration AM vs HS

## 2024-01-25 NOTE — ASSESSMENT & PLAN NOTE
Debility following hospitalization for severe illnesses  OT/PT/nutrition support  Optimize chronic medical conditions

## 2024-01-26 ENCOUNTER — NURSING HOME VISIT (OUTPATIENT)
Dept: GERIATRICS | Facility: OTHER | Age: 79
End: 2024-01-26
Payer: MEDICARE

## 2024-01-26 DIAGNOSIS — N18.2 CKD (CHRONIC KIDNEY DISEASE) STAGE 2, GFR 60-89 ML/MIN: ICD-10-CM

## 2024-01-26 DIAGNOSIS — R26.2 AMBULATORY DYSFUNCTION: ICD-10-CM

## 2024-01-26 DIAGNOSIS — Z79.4 TYPE 2 DIABETES MELLITUS WITH HYPERGLYCEMIA, WITH LONG-TERM CURRENT USE OF INSULIN (HCC): ICD-10-CM

## 2024-01-26 DIAGNOSIS — R53.81 PHYSICAL DECONDITIONING: ICD-10-CM
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
## 2024-01-26 DIAGNOSIS — E11.65 TYPE 2 DIABETES MELLITUS WITH HYPERGLYCEMIA, WITH LONG-TERM CURRENT USE OF INSULIN (HCC): ICD-10-CM

## 2024-01-26 DIAGNOSIS — D62 ACUTE BLOOD LOSS ANEMIA (ABLA): ICD-10-CM

## 2024-01-26 DIAGNOSIS — H60.22 ACUTE MALIGNANT OTITIS EXTERNA OF LEFT EAR: ICD-10-CM

## 2024-01-26 DIAGNOSIS — S20.211S CHEST WALL CONTUSION, RIGHT, SEQUELA: ICD-10-CM

## 2024-01-26 PROCEDURE — 99309 SBSQ NF CARE MODERATE MDM 30: CPT

## 2024-01-27 VITALS
BODY MASS INDEX: 30.69 KG/M2 | OXYGEN SATURATION: 95 % | DIASTOLIC BLOOD PRESSURE: 73 MMHG | HEART RATE: 74 BPM | WEIGHT: 207.8 LBS | SYSTOLIC BLOOD PRESSURE: 121 MMHG | TEMPERATURE: 97.1 F | RESPIRATION RATE: 18 BRPM

## 2024-01-27 LAB
ATRIAL RATE: 82 BPM
P AXIS: 39 DEGREES
PR INTERVAL: 206 MS
QRS AXIS: 16 DEGREES
QRSD INTERVAL: 84 MS
QT INTERVAL: 372 MS
QTC INTERVAL: 434 MS
T WAVE AXIS: 55 DEGREES
VENTRICULAR RATE: 82 BPM

## 2024-01-27 NOTE — ASSESSMENT & PLAN NOTE
Lab Results   Component Value Date    HGBA1C 13.8 (H) 11/23/2023   Poorly controlled diabetes  Morning   Continue accu-cheks  Continue Lantus 30 units HS  Encourage diabetic diet  Repeat A1c 2/2024

## 2024-01-27 NOTE — ASSESSMENT & PLAN NOTE
Recent GIB  No reports of active bleeding from staff  1/26/24 H/H 11.6/36  Continue to monitor CBC

## 2024-01-27 NOTE — ASSESSMENT & PLAN NOTE
Debility following hospital stay for acute otitis externa, malignant mastoiditis and osteomyelitis, s/p surgery.  Continue PT/OT  Continue fall precautions  Encourage adequate PO hydration/nutrition

## 2024-01-27 NOTE — PROGRESS NOTES
[facility]  [address] [address] [phone]  [facility]  Code [facility] (STR)        NAME: Milton Ortiz  AGE: 78 y.o. SEX: male CODE STATUS: CPR    DATE OF ENCOUNTER: 1/27/2024    Assessment and Plan     1. Fall, subsequent encounter  Assessment & Plan:  Patient with recent hospital visit following mechanical fall at home  Patient fell when reaching to  his cell phone  Struck right side on table  Trauma workup negative for fracture, CT showed right lateral chest wall contusion  Continue Pain control with Lidoderm patch, tylenol, oxycodone, Lyrica  Continue PT/OT for strength training  Maintain fall/safety precaution      2. Chest wall contusion, right, sequela  Assessment & Plan:  S/p fall hitting right side of chest on side table  Negative for fractures  CT of chest 1/23/24 showed right lateral chest wall subcutaneous contusion  Continue pain management with Lidoderm patch, tylenol, oxycodone, Lyrica      3. Physical deconditioning  Assessment & Plan:  Debility following hospital stay for acute otitis externa, malignant mastoiditis and osteomyelitis, s/p surgery.  Continue PT/OT  Continue fall precautions  Encourage adequate PO hydration/nutrition      4. Ambulatory dysfunction  Assessment & Plan:  Multifactorial in the setting of debility, acute/chronic medical conditions  Continue PT/OT  Fall Precautions  Ensure adequate nutrition/hydration   Monitor CBC/BMP    following for d/c planning        5. Acute blood loss anemia (ABLA)  Assessment & Plan:  Recent GIB  No reports of active bleeding from staff  1/26/24 H/H 11.6/36  Continue to monitor CBC      6. CKD (chronic kidney disease) stage 2, GFR 60-89 ml/min  Assessment & Plan:  Baseline Cr 1.1  1/26/24 Cr 0.9/BUN 13/GFR >60  Avoid nephrotoxins  Encourage adequate PO hydration  Avoid hypotension  Monitor kidney functions      7. Acute malignant otitis externa of left ear  Assessment & Plan:  Cultures positive  for Pseudomonas  Followed by ID  Will complete 6 weeks course of Cipro 750 mg BID 1/28/24  Outpatient f/u with ID, ENT      8. Type 2 diabetes mellitus with hyperglycemia, with long-term current use of insulin (HCC)  Assessment & Plan:    Lab Results   Component Value Date    HGBA1C 13.8 (H) 11/23/2023   Poorly controlled diabetes  Morning   Continue accu-cheks  Continue Lantus 30 units HS  Encourage diabetic diet  Repeat A1c 2/2024           All medications and routine orders were reviewed and updated as needed.    Chief Complaint     STR follow up visit  Patient's care was coordinated with nursing facility staff. Recent vitals, labs, and updated medications were review on [facility] system in facility.  Past Medical and Surgical History      Past Medical History:   Diagnosis Date    Allergic     Chronic leg pain     Resolved 10/16/2014     Coronary artery disease     Diabetes mellitus (HCC)     Diabetes mellitus type II, uncontrolled     Last assessed 8/10/2014     Diabetes mellitus with neurological manifestation (HCC)     Last assessed 10/2/2014     Type 2 diabetes mellitus with hyperglycemia (HCC)     Last assessed 7/9/2013     Varicose veins of both lower extremities     Last assessed 10/11/2010      Past Surgical History:   Procedure Laterality Date    CORONARY STENT PLACEMENT      2 stents placed    VARICOSE VEIN SURGERY      15 yrs. ago both legs.      Allergies   Allergen Reactions    Glyburide      Rash and facial swelling      Latex     Medical Tape     Metformin     Morphine Hives    Penicillins     Prasugrel Hives    Repaglinide      Diarrhea and joint pain    Sulfa Antibiotics     Clopidogrel Rash          History of Present Illness     HPI  Milton Ortiz is a 78 year old male, he is a STR patient of [facility] since 1/23/24. Past Medical Hx including but not limited to GIB, diabetes, HTN,CAD, trigeminal neuralgia, CKD, DVT, osteomyelitis of the skull. He was seen in  collaboration with nursing for medical mgmt and STR follow up.     Hospital Course  Patient was admitted to the hospital 1/23/24 following mechanical fall at home. Patient dropped his cell phone and fell to the side when trying to pick it up. Patient c/o right sided rib pain after hitting a side table. He denied head strike or LOC, denied SOB/RHODES. CT of chest negative for rib fracture, showed right lateral subcutaneous contusion.  Patient had been receiving rehab at [facility] following hospital stay for acute otitis externa, malignant mastoiditis and osteomyelitis, s/p surgery. Patient had been discharged to home for only a few days before his fall. Family reports he had difficulty ambulating and requested therapy.  [facility].     Rehab Course  Milton was seen and examined at bedside today. Patient is a reliable historian and is AAOx3. On exam Milton is sitting in his chair and does not appear to be in distress.  He reports right sided pain that improves with pain medication. He denies difficulty sleeping and has a good appetite.  He has been participating in therapy.    He denies SOB/N/V/D. Denies lightheadedness, dizziness, headaches, vision changes. Patient states they are eating well and staying hydrated. Denies any bowel or bladder issues. Per review of SNF records, Patient is eating 3 meals per day, consuming  %. Last documented BM 1/26/24. No concerns from nursing at this time.    The patient's allergies, past medical, surgical, social and family history were reviewed and unchanged.    Review of Systems     Review of Systems   Constitutional:  Positive for activity change. Negative for appetite change and fever.   HENT: Negative.  Negative for congestion.    Eyes: Negative.    Respiratory: Negative.  Negative for cough, shortness of breath and wheezing.    Cardiovascular: Negative.  Negative for chest pain, palpitations and leg swelling.   Gastrointestinal: Negative.  Negative  for abdominal distention, abdominal pain, blood in stool, constipation, diarrhea, nausea and vomiting.   Endocrine: Negative.    Genitourinary: Negative.  Negative for difficulty urinating and dysuria.   Musculoskeletal:  Positive for gait problem.        Right sided pain   Skin: Negative.    Allergic/Immunologic: Negative.    Neurological:  Positive for weakness. Negative for dizziness and headaches.   Hematological: Negative.    Psychiatric/Behavioral:  Negative for confusion and sleep disturbance.          Objective     Vitals:   Vitals:    01/27/24 1617   BP: 121/73   Pulse: 74   Resp: 18   Temp: (!) 97.1 °F (36.2 °C)   SpO2: 95%         Physical Exam  Vitals and nursing note reviewed.   Constitutional:       General: He is not in acute distress.     Appearance: Normal appearance. He is not ill-appearing.   HENT:      Head: Normocephalic and atraumatic.      Nose: No congestion.   Eyes:      Conjunctiva/sclera: Conjunctivae normal.   Cardiovascular:      Rate and Rhythm: Normal rate and regular rhythm.      Heart sounds: Normal heart sounds.   Pulmonary:      Effort: Pulmonary effort is normal. No respiratory distress.      Breath sounds: Normal breath sounds. No wheezing.   Abdominal:      General: Bowel sounds are normal. There is no distension.      Palpations: Abdomen is soft.      Tenderness: There is no abdominal tenderness.   Musculoskeletal:      Right lower leg: No edema.      Left lower leg: No edema.   Skin:     Findings: Bruising present.      Comments: Right chest wall   Neurological:      Mental Status: He is alert and oriented to person, place, and time.      Motor: Weakness present.      Gait: Gait abnormal.   Psychiatric:         Mood and Affect: Mood normal.         Behavior: Behavior normal.         Pertinent Laboratory/Diagnostic Studies:   Reviewed in facility chart-stable      Current Medications   Medications reviewed and updated see facility MAR for details.      Current Outpatient  "Medications:     acetaminophen (TYLENOL) 650 mg CR tablet, Take 975 mg by mouth 3 (three) times a day, Disp: , Rfl:     aspirin 81 MG tablet, Take 81 mg by mouth daily, Disp: , Rfl:     Cholecalciferol (Vitamin D3) 1.25 MG (06664 UT) CAPS, Take 10,000 Units by mouth daily, Disp: , Rfl:     ciprofloxacin (CIPRO) 750 mg tablet, Take 750 mg by mouth 2 (two) times a day, Disp: , Rfl:     ferrous sulfate 325 (65 Fe) mg tablet, Take 325 mg by mouth daily with breakfast Once daily in the am and once daily at 1400 while taking Ciprofloxacin then return to once daily in the am., Disp: , Rfl:     insulin glargine (LANTUS) 100 units/mL subcutaneous injection, Inject 30 Units under the skin in the morning AM administration, Disp: , Rfl:     lidocaine (LIDODERM) 5 %, Apply 1 patch topically over 12 hours every 24 hours Remove & Discard patch within 12 hours or as directed by MD, Disp: 15 patch, Rfl: 0    Lidocaine 4 % PTCH, Apply 1 patch topically daily Apply to left knee daily on in am, off at hs, Disp: , Rfl:     nitroglycerin (NITROSTAT) 0.4 mg SL tablet, Place 0.4 mg under the tongue every 5 (five) minutes as needed for chest pain, Disp: , Rfl:     oxyCODONE HCl 5 MG TABA, Take 5 mg by mouth every 8 (eight) hours as needed, Disp: , Rfl:     pantoprazole (PROTONIX) 40 mg tablet, Take 1 tablet (40 mg total) by mouth 2 (two) times a day before meals, Disp: 30 tablet, Rfl: 0    pravastatin (PRAVACHOL) 40 mg tablet, TAKE 1 TABLET BY MOUTH EVERY NIGHT AT BEDTIME, Disp: 90 tablet, Rfl: 0    pregabalin (LYRICA) 75 mg capsule, Take 1 capsule (75 mg total) by mouth 3 (three) times a day, Disp: 30 capsule, Rfl: 0     Please note:  Voice-recognition software may have been used in the preparation of this document.  Occasional wrong word or \"sound-alike\" substitutions may have occurred due to the inherent limitations of voice recognition software.  Interpretation should be guided by context.         JENNIFER Argueta  1/27/2024  " 4:58 PM

## 2024-01-27 NOTE — ASSESSMENT & PLAN NOTE
S/p fall hitting right side of chest on side table  Negative for fractures  CT of chest 1/23/24 showed right lateral chest wall subcutaneous contusion  Continue pain management with Lidoderm patch, tylenol, oxycodone, Lyrica

## 2024-01-27 NOTE — ASSESSMENT & PLAN NOTE
Cultures positive for Pseudomonas  Followed by ID  Will complete 6 weeks course of Cipro 750 mg BID 1/28/24  Outpatient f/u with ID, ENT

## 2024-01-27 NOTE — ASSESSMENT & PLAN NOTE
Baseline Cr 1.1  1/26/24 Cr 0.9/BUN 13/GFR >60  Avoid nephrotoxins  Encourage adequate PO hydration  Avoid hypotension  Monitor kidney functions

## 2024-01-27 NOTE — ASSESSMENT & PLAN NOTE
Multifactorial in the setting of debility, acute/chronic medical conditions  Continue PT/OT  Fall Precautions  Ensure adequate nutrition/hydration   Monitor CBC/BMP    following for d/c planning

## 2024-01-27 NOTE — ASSESSMENT & PLAN NOTE
Patient with recent hospital visit following mechanical fall at home  Patient fell when reaching to  his cell phone  Struck right side on table  Trauma workup negative for fracture, CT showed right lateral chest wall contusion  Continue Pain control with Lidoderm patch, tylenol, oxycodone, Lyrica  Continue PT/OT for strength training  Maintain fall/safety precaution

## 2024-01-30 ENCOUNTER — NURSING HOME VISIT (OUTPATIENT)
Dept: GERIATRICS | Facility: OTHER | Age: 79
End: 2024-01-30
Payer: MEDICARE

## 2024-01-30 VITALS
DIASTOLIC BLOOD PRESSURE: 73 MMHG | HEART RATE: 74 BPM | TEMPERATURE: 97.1 F | RESPIRATION RATE: 18 BRPM | SYSTOLIC BLOOD PRESSURE: 121 MMHG | OXYGEN SATURATION: 95 %

## 2024-01-30 DIAGNOSIS — H60.22 ACUTE MALIGNANT OTITIS EXTERNA OF LEFT EAR: ICD-10-CM

## 2024-01-30 DIAGNOSIS — N18.2 CKD (CHRONIC KIDNEY DISEASE) STAGE 2, GFR 60-89 ML/MIN: ICD-10-CM

## 2024-01-30 DIAGNOSIS — R26.2 AMBULATORY DYSFUNCTION: ICD-10-CM

## 2024-01-30 DIAGNOSIS — R53.81 PHYSICAL DECONDITIONING: ICD-10-CM
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
## 2024-01-30 DIAGNOSIS — Z79.4 TYPE 2 DIABETES MELLITUS WITH HYPERGLYCEMIA, WITH LONG-TERM CURRENT USE OF INSULIN (HCC): ICD-10-CM

## 2024-01-30 DIAGNOSIS — S20.211S CHEST WALL CONTUSION, RIGHT, SEQUELA: Primary | ICD-10-CM

## 2024-01-30 DIAGNOSIS — M86.18 OTHER ACUTE OSTEOMYELITIS, OTHER SITE (HCC): ICD-10-CM

## 2024-01-30 DIAGNOSIS — E11.65 TYPE 2 DIABETES MELLITUS WITH HYPERGLYCEMIA, WITH LONG-TERM CURRENT USE OF INSULIN (HCC): ICD-10-CM

## 2024-01-30 PROCEDURE — 99309 SBSQ NF CARE MODERATE MDM 30: CPT

## 2024-01-30 NOTE — PROGRESS NOTES
[facility]  [address] [address] [phone]  [facility]  Code 31 (STR)        NAME: Milton Ortiz  AGE: 78 y.o. SEX: male CODE STATUS: CPR    DATE OF ENCOUNTER: 1/30/2024    Assessment and Plan     1. Chest wall contusion, right, sequela  Assessment & Plan:  S/p fall hitting right side of chest on side table  Negative for fractures  CT of chest 1/23/24 showed right lateral chest wall subcutaneous contusion  Continue pain management with Lidoderm patch, tylenol, oxycodone, Lyrica      2. Ambulatory dysfunction  Assessment & Plan:  Multifactorial in the setting of debility, acute/chronic medical conditions  Continue PT/OT  Fall Precautions  Ensure adequate nutrition/hydration   Monitor CBC/BMP    following for d/c planning        3. Physical deconditioning  Assessment & Plan:  Debility following hospital stay for acute otitis externa, malignant mastoiditis and osteomyelitis, s/p surgery.  Continue PT/OT  Continue fall precautions  Encourage adequate PO hydration/nutrition      4. Fall, subsequent encounter  Assessment & Plan:  Patient with recent hospital visit following mechanical fall at home  Patient fell when reaching to  his cell phone  Struck right side on table  Trauma workup negative for fracture, CT showed right lateral chest wall contusion  Continue Pain control with Lidoderm patch, tylenol, oxycodone, Lyrica  Continue PT/OT for strength training  Maintain fall/safety precaution      5. CKD (chronic kidney disease) stage 2, GFR 60-89 ml/min  Assessment & Plan:  Baseline Cr 1.1  1/26/24 Cr 0.9/BUN 13/GFR >60  Avoid nephrotoxins  Encourage adequate PO hydration  Avoid hypotension  Monitor kidney functions      6. Acute malignant otitis externa of left ear  Assessment & Plan:  Cultures positive for Pseudomonas  Followed by ID  Will complete 6 weeks course of Cipro 750 mg BID 1/28/24  Outpatient f/u with ID, ENT      7. Type 2 diabetes mellitus with  hyperglycemia, with long-term current use of insulin (HCC)  Assessment & Plan:    Lab Results   Component Value Date    HGBA1C 13.8 (H) 11/23/2023   Poorly controlled diabetes  Morning   Continue accu-cheks  Continue Lantus 30 units HS  Encourage diabetic diet  Repeat A1c 2/2024           All medications and routine orders were reviewed and updated as needed.    Chief Complaint     STR follow up visit  Patient's care was coordinated with nursing facility staff. Recent vitals, labs, and updated medications were review on [facility] system in facility.  Past Medical and Surgical History      Past Medical History:   Diagnosis Date    Allergic     Chronic leg pain     Resolved 10/16/2014     Coronary artery disease     Diabetes mellitus (HCC)     Diabetes mellitus type II, uncontrolled     Last assessed 8/10/2014     Diabetes mellitus with neurological manifestation (HCC)     Last assessed 10/2/2014     Type 2 diabetes mellitus with hyperglycemia (HCC)     Last assessed 7/9/2013     Varicose veins of both lower extremities     Last assessed 10/11/2010      Past Surgical History:   Procedure Laterality Date    CORONARY STENT PLACEMENT      2 stents placed    VARICOSE VEIN SURGERY      15 yrs. ago both legs.      Allergies   Allergen Reactions    Glyburide      Rash and facial swelling      Latex     Medical Tape     Metformin     Morphine Hives    Penicillins     Prasugrel Hives    Repaglinide      Diarrhea and joint pain    Sulfa Antibiotics     Clopidogrel Rash          History of Present Illness     HPI  Milton Ortiz is a 78 year old male, he is a STR patient of [facility] since 1/23/24. Past Medical Hx including but not limited to GIB, diabetes, HTN,CAD, trigeminal neuralgia, CKD, DVT, osteomyelitis of the skull. He was seen in collaboration with nursing for medical mgmt and STR follow up.     Hospital Course  Patient was admitted to the hospital 1/23/24 following mechanical fall at home.  Patient dropped his cell phone and fell to the side when trying to pick it up. Patient c/o right sided rib pain after hitting a side table. He denied head strike or LOC, denied SOB/RHODES. CT of chest negative for rib fracture, showed right lateral subcutaneous contusion.  Patient had been receiving rehab at [facility] following hospital stay for acute otitis externa, malignant mastoiditis and osteomyelitis, s/p surgery. Patient had been discharged to home for only a few days before his fall. Family reports he had difficulty ambulating and requested therapy.  [facility] to [facility].     Rehab Course  Milton was seen and examined at bedside today. Patient is a reliable historian and is AAOx3. On exam Milton is sitting in his chair and does not appear to be in distress.  His pain has improved, he denies difficulty sleeping and has a good appetite.  He has been participating in therapy. He says he has been working on steps so he will be able to go home soon.     He denies SOB/N/V/D. Denies lightheadedness, dizziness, headaches, vision changes. Patient states they are eating well and staying hydrated. Denies any bowel or bladder issues. Per review of SNF records, Patient is eating 3 meals per day, consuming  %. Last documented BM 1/29/24. No concerns from nursing at this time.    The patient's allergies, past medical, surgical, social and family history were reviewed and unchanged.    Review of Systems     Review of Systems   Constitutional:  Positive for activity change. Negative for appetite change and fever.   HENT: Negative.  Negative for congestion.    Eyes: Negative.    Respiratory: Negative.  Negative for cough, shortness of breath and wheezing.    Cardiovascular: Negative.  Negative for chest pain, palpitations and leg swelling.   Gastrointestinal: Negative.  Negative for abdominal distention, abdominal pain, blood in stool, constipation, diarrhea, nausea and vomiting.   Endocrine: Negative.     Genitourinary: Negative.  Negative for difficulty urinating and dysuria.   Musculoskeletal:  Positive for gait problem.        Right sided pain   Skin: Negative.    Allergic/Immunologic: Negative.    Neurological:  Positive for weakness. Negative for dizziness and headaches.   Hematological: Negative.    Psychiatric/Behavioral:  Negative for confusion and sleep disturbance.          Objective     Vitals:   Vitals:    01/30/24 1644   BP: 121/73   Pulse: 74   Resp: 18   Temp: (!) 97.1 °F (36.2 °C)   SpO2: 95%           Physical Exam  Vitals and nursing note reviewed.   Constitutional:       General: He is not in acute distress.     Appearance: Normal appearance. He is not ill-appearing.   HENT:      Head: Normocephalic and atraumatic.      Nose: No congestion.   Eyes:      Conjunctiva/sclera: Conjunctivae normal.   Cardiovascular:      Rate and Rhythm: Normal rate and regular rhythm.      Heart sounds: Normal heart sounds.   Pulmonary:      Effort: Pulmonary effort is normal. No respiratory distress.      Breath sounds: Normal breath sounds. No wheezing.   Abdominal:      General: Bowel sounds are normal. There is no distension.      Palpations: Abdomen is soft.      Tenderness: There is no abdominal tenderness.   Musculoskeletal:      Right lower leg: No edema.      Left lower leg: No edema.   Skin:     Findings: Bruising present.      Comments: Right chest wall   Neurological:      Mental Status: He is alert and oriented to person, place, and time.      Motor: Weakness present.      Gait: Gait abnormal.   Psychiatric:         Mood and Affect: Mood normal.         Behavior: Behavior normal.         Pertinent Laboratory/Diagnostic Studies:   Reviewed in facility chart-stable      Current Medications   Medications reviewed and updated see facility MAR for details.      Current Outpatient Medications:     acetaminophen (TYLENOL) 650 mg CR tablet, Take 975 mg by mouth 3 (three) times a day, Disp: , Rfl:     aspirin 81  "MG tablet, Take 81 mg by mouth daily, Disp: , Rfl:     Cholecalciferol (Vitamin D3) 1.25 MG (77325 UT) CAPS, Take 10,000 Units by mouth daily, Disp: , Rfl:     ferrous sulfate 325 (65 Fe) mg tablet, Take 325 mg by mouth daily with breakfast Once daily in the am and once daily at 1400 while taking Ciprofloxacin then return to once daily in the am., Disp: , Rfl:     insulin glargine (LANTUS) 100 units/mL subcutaneous injection, Inject 30 Units under the skin in the morning AM administration, Disp: , Rfl:     lidocaine (LIDODERM) 5 %, Apply 1 patch topically over 12 hours every 24 hours Remove & Discard patch within 12 hours or as directed by MD, Disp: 15 patch, Rfl: 0    Lidocaine 4 % PTCH, Apply 1 patch topically daily Apply to left knee daily on in am, off at hs, Disp: , Rfl:     nitroglycerin (NITROSTAT) 0.4 mg SL tablet, Place 0.4 mg under the tongue every 5 (five) minutes as needed for chest pain, Disp: , Rfl:     oxyCODONE HCl 5 MG TABA, Take 5 mg by mouth every 8 (eight) hours as needed, Disp: , Rfl:     pantoprazole (PROTONIX) 40 mg tablet, Take 1 tablet (40 mg total) by mouth 2 (two) times a day before meals, Disp: 30 tablet, Rfl: 0    pravastatin (PRAVACHOL) 40 mg tablet, TAKE 1 TABLET BY MOUTH EVERY NIGHT AT BEDTIME, Disp: 90 tablet, Rfl: 0    pregabalin (LYRICA) 75 mg capsule, Take 1 capsule (75 mg total) by mouth 3 (three) times a day, Disp: 30 capsule, Rfl: 0     Please note:  Voice-recognition software may have been used in the preparation of this document.  Occasional wrong word or \"sound-alike\" substitutions may have occurred due to the inherent limitations of voice recognition software.  Interpretation should be guided by context.         JENNIFER Argueta  1/30/2024  4:49 PM    "

## 2024-01-31 RX ORDER — PREGABALIN 75 MG/1
75 CAPSULE ORAL 3 TIMES DAILY
Qty: 60 CAPSULE | Refills: 0 | Status: SHIPPED | OUTPATIENT
Start: 2024-01-31

## 2024-02-02 ENCOUNTER — NURSING HOME VISIT (OUTPATIENT)
Dept: GERIATRICS | Facility: OTHER | Age: 79
End: 2024-02-02
Payer: MEDICARE

## 2024-02-02 VITALS
SYSTOLIC BLOOD PRESSURE: 121 MMHG | DIASTOLIC BLOOD PRESSURE: 71 MMHG | HEART RATE: 74 BPM | TEMPERATURE: 97.1 F | OXYGEN SATURATION: 95 % | RESPIRATION RATE: 18 BRPM
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
## 2024-02-02 DIAGNOSIS — R53.81 PHYSICAL DECONDITIONING: ICD-10-CM

## 2024-02-02 DIAGNOSIS — R26.2 AMBULATORY DYSFUNCTION: ICD-10-CM

## 2024-02-02 DIAGNOSIS — N18.2 CKD (CHRONIC KIDNEY DISEASE) STAGE 2, GFR 60-89 ML/MIN: ICD-10-CM

## 2024-02-02 DIAGNOSIS — H60.22 ACUTE MALIGNANT OTITIS EXTERNA OF LEFT EAR: Primary | ICD-10-CM

## 2024-02-02 DIAGNOSIS — S20.211S CHEST WALL CONTUSION, RIGHT, SEQUELA: ICD-10-CM

## 2024-02-02 PROCEDURE — 99316 NF DSCHRG MGMT 30 MIN+: CPT

## 2024-02-02 NOTE — ASSESSMENT & PLAN NOTE
S/p fall hitting right side of chest on side table  Negative for fractures  CT of chest 1/23/24 showed right lateral chest wall subcutaneous contusion  Continue pain management with tylenol, Lyrica

## 2024-02-02 NOTE — LETTER
February 2, 2024     Shade Gordon, DO  [address]  [address]  [facility] [address]    Patient: Milton Ortiz   YOB: 1945   Date of Visit: 2/2/2024       Dear Dr. Gordon:    Sincerely,        JENNIFER Argueta        CC: No Recipients    JENNIFER Argueta  2/2/2024  6:08 PM  Sign when Signing Visit  [address] [address]  [phone]  DISCHARGE SUMMARY  [facility]ility[facility]  Code 31    NAME: Milton Ortiz  AGE: 78 y.o. SEX: male   CODE STATUS: CPR    DATE OF ADMISSION: 1/23/24    DATE OF DISCHARGE: 2/3/24   DISCHARGE DISPOSITION: Stable for discharge to home with family support and home health PT/OT/SN services.   Reason for Admission: Patient was admitted to [facility] for [facility] after hospitalization for fall.    Past Medical and Surgical History:   Past Medical History:   Diagnosis Date   • Allergic    • Chronic leg pain     Resolved 10/16/2014    • Coronary artery disease    • Diabetes mellitus (HCC)    • Diabetes mellitus type II, uncontrolled     Last assessed 8/10/2014    • Diabetes mellitus with neurological manifestation (HCC)     Last assessed 10/2/2014    • Type 2 diabetes mellitus with hyperglycemia (HCC)     Last assessed 7/9/2013    • Varicose veins of both lower extremities     Last assessed 10/11/2010       Past Surgical History:   Procedure Laterality Date   • CORONARY STENT PLACEMENT      2 stents placed   • VARICOSE VEIN SURGERY      15 yrs. ago both legs.        Course of stay:   HPI  Milton Ortiz is a 78 year old male, he is a STR patient of [facility] since 1/23/24. Past Medical Hx including but not limited to GIB, diabetes, HTN,CAD, trigeminal neuralgia, CKD, DVT, osteomyelitis of the skull. He was seen in collaboration with nursing for medical mgmt and STR follow up.      Hospital Course  Patient was admitted to the hospital 1/23/24 following mechanical fall at home. Patient dropped his cell  phone and fell to the side when trying to pick it up. Patient c/o right sided rib pain after hitting a side table. He denied head strike or LOC, denied SOB/RHODES. CT of chest negative for rib fracture, showed right lateral subcutaneous contusion.  Patient had been receiving rehab at [facility] following hospital stay for acute otitis externa, malignant mastoiditis and osteomyelitis, s/p surgery. Patient had been discharged to home for only a few days before his fall. Family reports he had difficulty ambulating and requested therapy.  Patient was discharged to [facility].      Rehab Course  Milton was seen and examined at bedside today. Patient is a reliable historian and is AAOx3. On exam Milton is sitting in his chair and does not appear to be in distress.  He says his right side does not hurt anymore and he has been able to do steps with therapy.  He feels he is ready to go home.   aware and is following. He denies difficulty sleeping and has a good appetite.   Will discontinue oxycodone as patient denies pain and has not been taking.  He denies SOB/N/V/D. Denies lightheadedness, dizziness, headaches, vision changes. Patient states they are eating well and staying hydrated. Denies any bowel or bladder issues. Per review of SNF records, Patient is eating 3 meals per day, consuming  %. Last documented BM 2/1/24. No concerns from nursing at this time.     During the patients stay at [facility], he received skilled nursing care, PT, OT, social service support, dietician support, and medical management. Pt scheduled to be discharged on 2/3/24.  ROS:  Review of Systems   Constitutional:  Negative for activity change, appetite change and fever.   HENT: Negative.  Negative for congestion.    Eyes: Negative.    Respiratory: Negative.  Negative for cough, shortness of breath and wheezing.    Cardiovascular: Negative.  Negative for chest pain, palpitations and leg swelling.   Gastrointestinal: Negative.  Negative  for abdominal distention, abdominal pain, blood in stool, constipation, diarrhea, nausea and vomiting.   Endocrine: Negative.    Genitourinary: Negative.  Negative for difficulty urinating and dysuria.   Musculoskeletal:  Negative for back pain and gait problem.   Skin: Negative.    Allergic/Immunologic: Negative.    Neurological:  Positive for weakness. Negative for dizziness and headaches.   Hematological: Negative.    Psychiatric/Behavioral:  Negative for confusion and sleep disturbance.        PHYSICAL EXAM:  VITALS:   Vitals:    02/02/24 1059   BP: 121/71   Pulse: 74   Resp: 18   Temp: (!) 97.1 °F (36.2 °C)   SpO2: 95%        Physical Exam  Vitals and nursing note reviewed.   Constitutional:       General: He is not in acute distress.     Appearance: Normal appearance. He is not ill-appearing.   HENT:      Head: Normocephalic and atraumatic.      Nose: No congestion.   Eyes:      Conjunctiva/sclera: Conjunctivae normal.   Cardiovascular:      Rate and Rhythm: Normal rate and regular rhythm.      Heart sounds: Normal heart sounds.   Pulmonary:      Effort: Pulmonary effort is normal. No respiratory distress.      Breath sounds: Normal breath sounds. No wheezing.   Abdominal:      General: Bowel sounds are normal. There is no distension.      Palpations: Abdomen is soft.      Tenderness: There is no abdominal tenderness.   Musculoskeletal:      Right lower leg: No edema.      Left lower leg: No edema.   Skin:     Findings: Bruising present.      Comments: Right chest wall   Neurological:      Mental Status: He is alert and oriented to person, place, and time.      Motor: No weakness.      Gait: Gait normal.   Psychiatric:         Mood and Affect: Mood normal.         Behavior: Behavior normal.         Admission Diagnoses:   1. Acute malignant otitis externa of left ear  Assessment & Plan:  Cultures positive for Pseudomonas  Followed by ID  Completed 6 weeks course of Cipro 750 mg BID 1/28/24  Outpatient f/u with  ID, ENT      2. CKD (chronic kidney disease) stage 2, GFR 60-89 ml/min  Assessment & Plan:  Baseline Cr 1.1  1/26/24 Cr 0.9/BUN 13/GFR >60  Avoid nephrotoxins  Encourage adequate PO hydration  Avoid hypotension  Monitor kidney functions      3. Ambulatory dysfunction  Assessment & Plan:  Multifactorial in the setting of debility, acute/chronic medical conditions  Continue PT/OT  Fall Precautions  Ensure adequate nutrition/hydration   Monitor CBC/BMP    following for d/c planning        4. Chest wall contusion, right, sequela  Assessment & Plan:  S/p fall hitting right side of chest on side table  Negative for fractures  CT of chest 1/23/24 showed right lateral chest wall subcutaneous contusion  Continue pain management with tylenol, Lyrica      5. Physical deconditioning  Assessment & Plan:  Debility following hospital stay for acute otitis externa, malignant mastoiditis and osteomyelitis, s/p surgery.  Continue PT/OT  Continue fall precautions  Encourage adequate PO hydration/nutrition      6. Fall, subsequent encounter  Assessment & Plan:  Patient with recent hospital visit following mechanical fall at home  Patient fell when reaching to  his cell phone  Struck right side on table  Trauma workup negative for fracture, CT showed right lateral chest wall contusion  Continue Pain control with  tylenol, Lyrica  Continue PT/OT for strength training  Maintain fall/safety precaution           Follow-up Recommendations:    Outpatient Follow up with PCP in the next 2 weeks  [facility] PT/OT/SN services     Labs and testing performed during stay:  1/26/24   WBC COUNT, AUTOMATED 4.5 K/CU.MM 3.5-11.0  Final              RBC COUNT 4.1 M/CU.MM 3.6-5.6  Final             HEMOGLOBIN 11.6 g/dL 12.1-17.1 L Final             HEMATOCRIT 36 PERCENT 36-51  Final             MCH 28 pg 25-32  Final             MCV 88 fL   Final             MCHC 32 g/dL 31-36  Final             RDW 13.9 % 11.6-14.4   Final             PLATELETS, AUTOMATED  Panel/Test: Platelets  [facility] Code: 777-3  Lab Test Description: PLATELETS, AUTOMATED    Specimen #:   Specimen Source:   Specimen Site Modifier:   Collection Volume:   No. of Sample Containers:     Observation Method:   Nature of Abnormality:  209 K/CU.-400  Final             MPV 10.3 fL 9.4-12.4  Final           COMPR. METABOLIC PANEL     CALCIUM 8.7 mg/dL 8.4-10.2  Final              CREATININE 0.9 mg/dL 0.6-1.5  Final             GLUCOSE 204 mg/dL 65-99 H Final             UREA NITROGEN 13 mg/dL 8-23  Final             PROTEIN, TOTAL 5.3 g/dL 6.4-8.3 L Final             ALBUMIN 3.6 g/dL 3.5-5.2  Final             BILIRUBIN,TOT. <0.3 mg/dL <1.2  Final             ALKALINE PHOSPHATASE 48 U/L   Final             AST - SGOT 17 U/L <40  Final             GLOBULIN 1.7 g/dL 2.3-3.5 L Final             A/G RATIO 2.1 RATIO 1.2-2.6  Final             UREA N / CREAT RATIO 14.4 RATIO 12-20  Final             SODIUM 144 mMOL/L 135-145  Final             POTASSIUM 3.7 mMOL/L 3.4-5.3  Final             CHLORIDE 107 mmol/L   Final             ALT - SGPT <10 U/L <40  Final             OSMOLALITY 304 mOsm/kG 275-305  Final     The Serum Osmolality Reference Range has been  adjusted based on current information provided  by the [facility].        CO2 27 mmol/L 22-32  Final             GFR >60 See note >60  Final         Discharge Medications: See discharge medication list which was reviewed and signed.      Current Outpatient Medications:   •  acetaminophen (TYLENOL) 650 mg CR tablet, Take 975 mg by mouth every 8 (eight) hours as needed for moderate pain, mild pain or headaches, Disp: , Rfl:   •  aspirin 81 MG tablet, Take 81 mg by mouth daily, Disp: , Rfl:   •  Cholecalciferol (Vitamin D3) 1.25 MG (28500 UT) CAPS, Take 10,000 Units by mouth daily, Disp: , Rfl:   •  ferrous sulfate 325 (65 Fe) mg tablet, Take 325 mg by mouth daily with breakfast Once daily in the am and  "once daily at 1400 while taking Ciprofloxacin then return to once daily in the am., Disp: , Rfl:   •  insulin glargine (LANTUS) 100 units/mL subcutaneous injection, Inject 30 Units under the skin in the morning AM administration, Disp: , Rfl:   •  nitroglycerin (NITROSTAT) 0.4 mg SL tablet, Place 0.4 mg under the tongue every 5 (five) minutes as needed for chest pain, Disp: , Rfl:   •  pantoprazole (PROTONIX) 40 mg tablet, Take 1 tablet (40 mg total) by mouth 2 (two) times a day before meals, Disp: 30 tablet, Rfl: 0  •  pravastatin (PRAVACHOL) 40 mg tablet, TAKE 1 TABLET BY MOUTH EVERY NIGHT AT BEDTIME, Disp: 90 tablet, Rfl: 0  •  pregabalin (LYRICA) 75 mg capsule, Take 1 capsule (75 mg total) by mouth 3 (three) times a day, Disp: 60 capsule, Rfl: 0     Discussion with patient/family and further instructions:  -Fall precautions  -Aspiration precautions  -Bleeding precautions  -Monitor for signs/symptoms of infection  -Medication list was reviewed and signed  -DME form was completed    Status at time of discharge: Stable     Billing based on time. Time spent on unit, 40 minutes. Time spent counseling pt on debility/condition, 30 minutes.    Please note:  Voice-recognition software may have been used in the preparation of this document.  Occasional wrong word or \"sound-alike\" substitutions may have occurred due to the inherent limitations of voice recognition software.  Interpretation should be guided by context.        JENNIFER Argueta  2/2/2024   "

## 2024-02-02 NOTE — PROGRESS NOTES
[facility]  [address] [address]  [phone]  DISCHARGE SUMMARY  Facility: [facility]  Code 31    NAME: Milton Ortiz  AGE: 78 y.o. SEX: male   CODE STATUS: CPR    DATE OF ADMISSION: 1/23/24    DATE OF DISCHARGE: 2/3/24   DISCHARGE DISPOSITION: Stable for discharge to home with family support and home health PT/OT/SN services.   Reason for Admission: Patient was admitted to [facility] for rehabilitation after hospitalization for fall.    Past Medical and Surgical History:   Past Medical History:   Diagnosis Date    Allergic     Chronic leg pain     Resolved 10/16/2014     Coronary artery disease     Diabetes mellitus (HCC)     Diabetes mellitus type II, uncontrolled     Last assessed 8/10/2014     Diabetes mellitus with neurological manifestation (HCC)     Last assessed 10/2/2014     Type 2 diabetes mellitus with hyperglycemia (HCC)     Last assessed 7/9/2013     Varicose veins of both lower extremities     Last assessed 10/11/2010       Past Surgical History:   Procedure Laterality Date    CORONARY STENT PLACEMENT      2 stents placed    VARICOSE VEIN SURGERY      15 yrs. ago both legs.        Course of stay:   HPI  Milton Ortiz is a 78 year old male, he is a STR patient of [facility] since 1/23/24. Past Medical Hx including but not limited to GIB, diabetes, HTN,CAD, trigeminal neuralgia, CKD, DVT, osteomyelitis of the skull. He was seen in collaboration with nursing for medical mgmt and STR follow up.      Hospital Course  Patient was admitted to the hospital 1/23/24 following mechanical fall at home. Patient dropped his cell phone and fell to the side when trying to pick it up. Patient c/o right sided rib pain after hitting a side table. He denied head strike or LOC, denied SOB/RHODES. CT of chest negative for rib fracture, showed right lateral subcutaneous contusion.  Patient had been receiving rehab at [facility] following hospital stay for acute otitis  externa, malignant mastoiditis and osteomyelitis, s/p surgery. Patient had been discharged to home for only a few days before his fall. Family reports he had difficulty ambulating and requested therapy.  Patient was discharged to [facility].      Rehab Course  Milton was seen and examined at bedside today. Patient is a reliable historian and is AAOx3. On exam Milton is sitting in his chair and does not appear to be in distress.  He says his right side does not hurt anymore and he has been able to do steps with therapy.  He feels he is ready to go home.   aware and is following. He denies difficulty sleeping and has a good appetite.   Will discontinue oxycodone as patient denies pain and has not been taking.  He denies SOB/N/V/D. Denies lightheadedness, dizziness, headaches, vision changes. Patient states they are eating well and staying hydrated. Denies any bowel or bladder issues. Per review of SNF records, Patient is eating 3 meals per day, consuming  %. Last documented BM 2/1/24. No concerns from nursing at this time.     During the patients stay at [facility], he received skilled nursing care, PT, OT, social service support, dietician support, and medical management. Pt scheduled to be discharged on 2/3/24.  ROS:  Review of Systems   Constitutional:  Negative for activity change, appetite change and fever.   HENT: Negative.  Negative for congestion.    Eyes: Negative.    Respiratory: Negative.  Negative for cough, shortness of breath and wheezing.    Cardiovascular: Negative.  Negative for chest pain, palpitations and leg swelling.   Gastrointestinal: Negative.  Negative for abdominal distention, abdominal pain, blood in stool, constipation, diarrhea, nausea and vomiting.   Endocrine: Negative.    Genitourinary: Negative.  Negative for difficulty urinating and dysuria.   Musculoskeletal:  Negative for back pain and gait problem.   Skin: Negative.    Allergic/Immunologic: Negative.    Neurological:  Positive  for weakness. Negative for dizziness and headaches.   Hematological: Negative.    Psychiatric/Behavioral:  Negative for confusion and sleep disturbance.        PHYSICAL EXAM:  VITALS:   Vitals:    02/02/24 1059   BP: 121/71   Pulse: 74   Resp: 18   Temp: (!) 97.1 °F (36.2 °C)   SpO2: 95%        Physical Exam  Vitals and nursing note reviewed.   Constitutional:       General: He is not in acute distress.     Appearance: Normal appearance. He is not ill-appearing.   HENT:      Head: Normocephalic and atraumatic.      Nose: No congestion.   Eyes:      Conjunctiva/sclera: Conjunctivae normal.   Cardiovascular:      Rate and Rhythm: Normal rate and regular rhythm.      Heart sounds: Normal heart sounds.   Pulmonary:      Effort: Pulmonary effort is normal. No respiratory distress.      Breath sounds: Normal breath sounds. No wheezing.   Abdominal:      General: Bowel sounds are normal. There is no distension.      Palpations: Abdomen is soft.      Tenderness: There is no abdominal tenderness.   Musculoskeletal:      Right lower leg: No edema.      Left lower leg: No edema.   Skin:     Findings: Bruising present.      Comments: Right chest wall   Neurological:      Mental Status: He is alert and oriented to person, place, and time.      Motor: No weakness.      Gait: Gait normal.   Psychiatric:         Mood and Affect: Mood normal.         Behavior: Behavior normal.         Admission Diagnoses:   1. Acute malignant otitis externa of left ear  Assessment & Plan:  Cultures positive for Pseudomonas  Followed by ID  Completed 6 weeks course of Cipro 750 mg BID 1/28/24  Outpatient f/u with ID, ENT      2. CKD (chronic kidney disease) stage 2, GFR 60-89 ml/min  Assessment & Plan:  Baseline Cr 1.1  1/26/24 Cr 0.9/BUN 13/GFR >60  Avoid nephrotoxins  Encourage adequate PO hydration  Avoid hypotension  Monitor kidney functions      3. Ambulatory dysfunction  Assessment & Plan:  Multifactorial in the setting of debility,  acute/chronic medical conditions  Continue PT/OT  Fall Precautions  Ensure adequate nutrition/hydration   Monitor CBC/BMP    following for d/c planning        4. Chest wall contusion, right, sequela  Assessment & Plan:  S/p fall hitting right side of chest on side table  Negative for fractures  CT of chest 1/23/24 showed right lateral chest wall subcutaneous contusion  Continue pain management with tylenol, Lyrica      5. Physical deconditioning  Assessment & Plan:  Debility following hospital stay for acute otitis externa, malignant mastoiditis and osteomyelitis, s/p surgery.  Continue PT/OT  Continue fall precautions  Encourage adequate PO hydration/nutrition      6. Fall, subsequent encounter  Assessment & Plan:  Patient with recent hospital visit following mechanical fall at home  Patient fell when reaching to  his cell phone  Struck right side on table  Trauma workup negative for fracture, CT showed right lateral chest wall contusion  Continue Pain control with  tylenol, Lyrica  Continue PT/OT for strength training  Maintain fall/safety precaution           Follow-up Recommendations:    Outpatient Follow up with PCP in the next 2 weeks  [facility] PT/OT/SN services     Labs and testing performed during stay:  1/26/24   WBC COUNT, AUTOMATED 4.5 K/CU.MM 3.5-11.0  Final              RBC COUNT 4.1 M/CU.MM 3.6-5.6  Final             HEMOGLOBIN 11.6 g/dL 12.1-17.1 L Final             HEMATOCRIT 36 PERCENT 36-51  Final             MCH 28 pg 25-32  Final             MCV 88 fL   Final             MCHC 32 g/dL 31-36  Final             RDW 13.9 % 11.6-14.4  Final             PLATELETS, AUTOMATED  Panel/Test: Platelets  [facility] Code: 777-3  Lab Test Description: PLATELETS, AUTOMATED    Specimen #:   Specimen Source:   Specimen Site Modifier:   Collection Volume:   No. of Sample Containers:     Observation Method:   Nature of Abnormality:  209 K/CU.-400  Final             MPV 10.3 fL  9.4-12.4  Final           COMPR. METABOLIC PANEL     CALCIUM 8.7 mg/dL 8.4-10.2  Final              CREATININE 0.9 mg/dL 0.6-1.5  Final             GLUCOSE 204 mg/dL 65-99 H Final             UREA NITROGEN 13 mg/dL 8-23  Final             PROTEIN, TOTAL 5.3 g/dL 6.4-8.3 L Final             ALBUMIN 3.6 g/dL 3.5-5.2  Final             BILIRUBIN,TOT. <0.3 mg/dL <1.2  Final             ALKALINE PHOSPHATASE 48 U/L   Final             AST - SGOT 17 U/L <40  Final             GLOBULIN 1.7 g/dL 2.3-3.5 L Final             A/G RATIO 2.1 RATIO 1.2-2.6  Final             UREA N / CREAT RATIO 14.4 RATIO 12-20  Final             SODIUM 144 mMOL/L 135-145  Final             POTASSIUM 3.7 mMOL/L 3.4-5.3  Final             CHLORIDE 107 mmol/L   Final             ALT - SGPT <10 U/L <40  Final             OSMOLALITY 304 mOsm/kG 275-305  Final     The Serum Osmolality Reference Range has been  adjusted based on current information provided  by the [facility].        CO2 27 mmol/L 22-32  Final             GFR >60 See note >60  Final         Discharge Medications: See discharge medication list which was reviewed and signed.      Current Outpatient Medications:     acetaminophen (TYLENOL) 650 mg CR tablet, Take 975 mg by mouth every 8 (eight) hours as needed for moderate pain, mild pain or headaches, Disp: , Rfl:     aspirin 81 MG tablet, Take 81 mg by mouth daily, Disp: , Rfl:     Cholecalciferol (Vitamin D3) 1.25 MG (84229 UT) CAPS, Take 10,000 Units by mouth daily, Disp: , Rfl:     ferrous sulfate 325 (65 Fe) mg tablet, Take 325 mg by mouth daily with breakfast Once daily in the am and once daily at 1400 while taking Ciprofloxacin then return to once daily in the am., Disp: , Rfl:     insulin glargine (LANTUS) 100 units/mL subcutaneous injection, Inject 30 Units under the skin in the morning AM administration, Disp: , Rfl:     nitroglycerin (NITROSTAT) 0.4 mg SL tablet, Place 0.4 mg under the tongue every 5 (five) minutes  "as needed for chest pain, Disp: , Rfl:     pantoprazole (PROTONIX) 40 mg tablet, Take 1 tablet (40 mg total) by mouth 2 (two) times a day before meals, Disp: 30 tablet, Rfl: 0    pravastatin (PRAVACHOL) 40 mg tablet, TAKE 1 TABLET BY MOUTH EVERY NIGHT AT BEDTIME, Disp: 90 tablet, Rfl: 0    pregabalin (LYRICA) 75 mg capsule, Take 1 capsule (75 mg total) by mouth 3 (three) times a day, Disp: 60 capsule, Rfl: 0     Discussion with patient/family and further instructions:  -Fall precautions  -Aspiration precautions  -Bleeding precautions  -Monitor for signs/symptoms of infection  -Medication list was reviewed and signed  -DME form was completed    Status at time of discharge: Stable     Billing based on time. Time spent on unit, 40 minutes. Time spent counseling pt on debility/condition, 30 minutes.    Please note:  Voice-recognition software may have been used in the preparation of this document.  Occasional wrong word or \"sound-alike\" substitutions may have occurred due to the inherent limitations of voice recognition software.  Interpretation should be guided by context.        JENNIFER Argueta  2/2/2024   "

## 2024-02-02 NOTE — ASSESSMENT & PLAN NOTE
Patient with recent hospital visit following mechanical fall at home  Patient fell when reaching to  his cell phone  Struck right side on table  Trauma workup negative for fracture, CT showed right lateral chest wall contusion  Continue Pain control with  tylenol, Lyrica  Continue PT/OT for strength training  Maintain fall/safety precaution

## 2024-02-02 NOTE — ASSESSMENT & PLAN NOTE
Cultures positive for Pseudomonas  Followed by ID  Completed 6 weeks course of Cipro 750 mg BID 1/28/24  Outpatient f/u with ID, ENT

## 2024-02-10 ENCOUNTER — APPOINTMENT (EMERGENCY)
Dept: VASCULAR ULTRASOUND | Facility: HOSPITAL | Age: 79
End: 2024-02-10
Payer: MEDICARE

## 2024-02-10 ENCOUNTER — HOSPITAL ENCOUNTER (EMERGENCY)
Facility: HOSPITAL | Age: 79
Discharge: HOME | End: 2024-02-10
Attending: EMERGENCY MEDICINE
Payer: MEDICARE

## 2024-02-10 VITALS
RESPIRATION RATE: 20 BRPM | HEART RATE: 85 BPM | DIASTOLIC BLOOD PRESSURE: 60 MMHG | TEMPERATURE: 97.5 F | SYSTOLIC BLOOD PRESSURE: 126 MMHG | OXYGEN SATURATION: 95 %

## 2024-02-10 DIAGNOSIS — S80.11XA LEG HEMATOMA, RIGHT, INITIAL ENCOUNTER: Primary | ICD-10-CM

## 2024-02-10 LAB
ANION GAP SERPL CALCULATED.3IONS-SCNC: 8 MMOL/L
BASOPHILS # BLD AUTO: 0.05 THOUSANDS/ÂΜL (ref 0–0.1)
BASOPHILS NFR BLD AUTO: 1 % (ref 0–1)
BUN SERPL-MCNC: 21 MG/DL (ref 5–25)
CALCIUM SERPL-MCNC: 9.8 MG/DL (ref 8.4–10.2)
CHLORIDE SERPL-SCNC: 103 MMOL/L (ref 96–108)
CO2 SERPL-SCNC: 26 MMOL/L (ref 21–32)
CREAT SERPL-MCNC: 0.9 MG/DL (ref 0.6–1.3)
EOSINOPHIL # BLD AUTO: 0.33 THOUSAND/ÂΜL (ref 0–0.61)
EOSINOPHIL NFR BLD AUTO: 5 % (ref 0–6)
ERYTHROCYTE [DISTWIDTH] IN BLOOD BY AUTOMATED COUNT: 13.9 % (ref 11.6–15.1)
GFR SERPL CREATININE-BSD FRML MDRD: 80 ML/MIN/1.73SQ M
GLUCOSE SERPL-MCNC: 201 MG/DL (ref 65–140)
HCT VFR BLD AUTO: 38.2 % (ref 36.5–49.3)
HGB BLD-MCNC: 12.4 G/DL (ref 12–17)
IMM GRANULOCYTES # BLD AUTO: 0.02 THOUSAND/UL (ref 0–0.2)
IMM GRANULOCYTES NFR BLD AUTO: 0 % (ref 0–2)
INR PPP: 0.98 (ref 0.84–1.19)
LYMPHOCYTES # BLD AUTO: 2.26 THOUSANDS/ÂΜL (ref 0.6–4.47)
LYMPHOCYTES NFR BLD AUTO: 37 % (ref 14–44)
MCH RBC QN AUTO: 27.7 PG (ref 26.8–34.3)
MCHC RBC AUTO-ENTMCNC: 32.5 G/DL (ref 31.4–37.4)
MCV RBC AUTO: 86 FL (ref 82–98)
MONOCYTES # BLD AUTO: 0.59 THOUSAND/ÂΜL (ref 0.17–1.22)
MONOCYTES NFR BLD AUTO: 10 % (ref 4–12)
NEUTROPHILS # BLD AUTO: 2.82 THOUSANDS/ÂΜL (ref 1.85–7.62)
NEUTS SEG NFR BLD AUTO: 47 % (ref 43–75)
NRBC BLD AUTO-RTO: 0 /100 WBCS
PLATELET # BLD AUTO: 243 THOUSANDS/UL (ref 149–390)
PMV BLD AUTO: 9.6 FL (ref 8.9–12.7)
POTASSIUM SERPL-SCNC: 4.8 MMOL/L (ref 3.5–5.3)
PROTHROMBIN TIME: 13.6 SECONDS (ref 11.6–14.5)
RBC # BLD AUTO: 4.47 MILLION/UL (ref 3.88–5.62)
SODIUM SERPL-SCNC: 137 MMOL/L (ref 135–147)
WBC # BLD AUTO: 6.07 THOUSAND/UL (ref 4.31–10.16)

## 2024-02-10 PROCEDURE — 93971 EXTREMITY STUDY: CPT

## 2024-02-10 PROCEDURE — 99284 EMERGENCY DEPT VISIT MOD MDM: CPT | Performed by: EMERGENCY MEDICINE

## 2024-02-10 PROCEDURE — 36415 COLL VENOUS BLD VENIPUNCTURE: CPT | Performed by: EMERGENCY MEDICINE

## 2024-02-10 PROCEDURE — 99285 EMERGENCY DEPT VISIT HI MDM: CPT

## 2024-02-10 PROCEDURE — 85025 COMPLETE CBC W/AUTO DIFF WBC: CPT | Performed by: EMERGENCY MEDICINE

## 2024-02-10 PROCEDURE — 80048 BASIC METABOLIC PNL TOTAL CA: CPT | Performed by: EMERGENCY MEDICINE

## 2024-02-10 PROCEDURE — 85610 PROTHROMBIN TIME: CPT | Performed by: EMERGENCY MEDICINE

## 2024-02-10 RX ORDER — ACETAMINOPHEN 325 MG/1
650 TABLET ORAL ONCE
Status: COMPLETED | OUTPATIENT
Start: 2024-02-10 | End: 2024-02-10

## 2024-02-10 RX ADMIN — ACETAMINOPHEN 650 MG: 325 TABLET, FILM COATED ORAL at 13:57

## 2024-02-10 NOTE — DISCHARGE INSTRUCTIONS
Today you were seen in the emergency department for right leg pain and swelling. Your workup included ultrasound of your right leg, and labs.  Your ultrasound was negative for DVT or superficial blood clot in your blood vessels.  I believe your symptoms to be the result of hematoma otherwise known as bruise. At this time there does not appear to be an emergent life threatening cause to explain your symptoms. You are stable for discharge home with outpatient follow up.     Please use warm compresses, and keep the right leg compressed with Ace bandage.  You may use Tylenol as needed to help with pain.    Please follow up with your primary care provider in the next 2-3 days. Please review all results discussed today with your primary care provider.     Please return to the emergency department as soon as possible if you develop uncontrollable fevers (Temp >100.4), uncontrollable pain, color change in the right leg, numbness/weakness in the right leg, difficulty walking, vomiting, chest pain, trouble breathing, or any other concerning symptoms.     Thank you for choosing [facility] for your care.

## 2024-02-10 NOTE — ED NOTES
Pt agreeable to labs at this time, US currently in room. Will obtain when finished.        Julian Rich RN  02/10/24 [id number]

## 2024-02-10 NOTE — ED PROVIDER NOTES
History  Chief Complaint   Patient presents with    Leg Pain     Pt noticed right lower leg pain onset last night, swelling, bruising. Denies injury/trauma     78-year-old male with history of CAD, CKD, DM, DVT, varicose veins presenting to the ED with complaints of right lower extremity pain and swelling onset last night.  Patient reports recent hospitalization/immobilization from treatment of osteomyelitis currently in rehab.  History of prior DVT, 5 years ago currently not on anticoagulation patient unclear if provoked versus unprovoked.  Patient still ambulatory with a walker, pain relieved with Tylenol but worse with palpation.  Denies fever, chills, chest pain, increased shortness of breath from his baseline, numbness out of his baseline, focal weakness, rash.  Denies history of malignancy, hemoptysis.  Denies injury or trauma.     Of note patient reports generalized fatigue since being diagnosed with osteomyelitis, recently got a call back from his doctor stating that the osteomyelitis will be treated with IV antibiotics, recommending oral Cipro outpatient and follow-up in 1 month.  Patient also reports recent episode of GI bleed requiring transfusion of 1 unit. Patient at this time denies any abdominal pain, nausea, vomiting, diarrhea, bloody/tarry stools.         Prior to Admission Medications   Prescriptions Last Dose Informant Patient Reported? Taking?   Cholecalciferol (Vitamin D3) 1.25 MG (07252 UT) CAPS   Yes No   Sig: Take 10,000 Units by mouth daily   acetaminophen (TYLENOL) 650 mg CR tablet   Yes No   Sig: Take 975 mg by mouth every 8 (eight) hours as needed for moderate pain, mild pain or headaches   aspirin 81 MG tablet   Yes No   Sig: Take 81 mg by mouth daily   ferrous sulfate 325 (65 Fe) mg tablet   Yes No   Sig: Take 325 mg by mouth daily with breakfast Once daily in the am and once daily at 1400 while taking Ciprofloxacin then return to once daily in the am.   insulin glargine (LANTUS) 100  units/mL subcutaneous injection   Yes No   Sig: Inject 30 Units under the skin in the morning AM administration   nitroglycerin (NITROSTAT) 0.4 mg SL tablet   Yes No   Sig: Place 0.4 mg under the tongue every 5 (five) minutes as needed for chest pain   pantoprazole (PROTONIX) 40 mg tablet   No No   Sig: Take 1 tablet (40 mg total) by mouth 2 (two) times a day before meals   pravastatin (PRAVACHOL) 40 mg tablet   No No   Sig: TAKE 1 TABLET BY MOUTH EVERY NIGHT AT BEDTIME   pregabalin (LYRICA) 75 mg capsule   No No   Sig: Take 1 capsule (75 mg total) by mouth 3 (three) times a day      Facility-Administered Medications: None       Past Medical History:   Diagnosis Date    Allergic     Chronic leg pain     Resolved 10/16/2014     Coronary artery disease     Diabetes mellitus (HCC)     Diabetes mellitus type II, uncontrolled     Last assessed 8/10/2014     Diabetes mellitus with neurological manifestation (HCC)     Last assessed 10/2/2014     Type 2 diabetes mellitus with hyperglycemia (HCC)     Last assessed 7/9/2013     Varicose veins of both lower extremities     Last assessed 10/11/2010        Past Surgical History:   Procedure Laterality Date    CORONARY STENT PLACEMENT      2 stents placed    VARICOSE VEIN SURGERY      15 yrs. ago both legs.        Family History   Problem Relation Age of Onset    Diabetes Father     COPD Daughter     Breast cancer additional onset Daughter     Diabetes Mother     Heart disease Mother 60     I have reviewed and agree with the history as documented.    E-Cigarette/Vaping     E-Cigarette/Vaping Substances     Social History     Tobacco Use    Smoking status: Never    Smokeless tobacco: Never    Tobacco comments:     Former smoker - As per [employer]    Substance Use Topics    Alcohol use: Never    Drug use: Never        Review of Systems   Constitutional:  Negative for chills and fever.   Respiratory:  Negative for cough and shortness of breath.    Cardiovascular:  Positive for leg  swelling. Negative for chest pain and palpitations.   Gastrointestinal:  Negative for abdominal pain, diarrhea, nausea and vomiting.   Genitourinary:  Negative for difficulty urinating and dysuria.   Musculoskeletal:  Positive for myalgias.   Skin:  Negative for color change and rash.   Neurological:  Negative for weakness and numbness.       Physical Exam  ED Triage Vitals   Temperature Pulse Respirations Blood Pressure SpO2   02/10/24 1338 02/10/24 1338 02/10/24 1338 02/10/24 1338 02/10/24 1338   97.5 °F (36.4 °C) 85 20 126/60 95 %      Temp Source Heart Rate Source Patient Position - Orthostatic VS BP Location FiO2 (%)   02/10/24 1338 02/10/24 1338 02/10/24 1338 02/10/24 1338 --   Oral Monitor Sitting Right arm       Pain Score       02/10/24 1339       No Pain             Orthostatic Vital Signs  Vitals:    02/10/24 1338   BP: 126/60   Pulse: 85   Patient Position - Orthostatic VS: Sitting       Physical Exam  Vitals and nursing note reviewed.   Constitutional:       Appearance: He is ill-appearing (Chronically). He is not toxic-appearing.   HENT:      Head: Normocephalic and atraumatic.      Right Ear: External ear normal.      Left Ear: External ear normal.      Nose: Nose normal. No congestion.      Mouth/Throat:      Mouth: Mucous membranes are moist.      Pharynx: Oropharynx is clear. No oropharyngeal exudate.   Eyes:      Extraocular Movements: Extraocular movements intact.      Conjunctiva/sclera: Conjunctivae normal.      Pupils: Pupils are equal, round, and reactive to light.   Cardiovascular:      Rate and Rhythm: Normal rate and regular rhythm.      Pulses: Normal pulses.      Heart sounds: Normal heart sounds. No murmur heard.  Pulmonary:      Effort: Tachypnea present.      Breath sounds: No stridor. No wheezing or rales.   Abdominal:      General: Bowel sounds are normal.      Tenderness: There is no abdominal tenderness. There is no guarding or rebound.   Musculoskeletal:         General: Swelling  and tenderness present.      Cervical back: Normal range of motion and neck supple. No rigidity or tenderness.      Right lower leg: No edema.      Left lower leg: No edema.      Comments: 3 cm in diameter indurated mass/swelling noted to right anterior shin that is tender to the touch with surrounding bruising.  Right calf tender to calf squeeze.  Distally neurovascularly intact to right lower extremity.  Full range of motion of right lower extremity.   Skin:     General: Skin is warm and dry.      Capillary Refill: Capillary refill takes less than 2 seconds.      Coloration: Skin is not jaundiced or pale.      Findings: Bruising present. No lesion or rash.   Neurological:      General: No focal deficit present.      Mental Status: He is alert and oriented to person, place, and time. Mental status is at baseline.   Psychiatric:         Mood and Affect: Mood normal.         Behavior: Behavior normal.         Thought Content: Thought content normal.         Judgment: Judgment normal.         ED Medications  Medications   acetaminophen (TYLENOL) tablet 650 mg (650 mg Oral Given 2/10/24 1357)       Diagnostic Studies  Results Reviewed       Procedure Component Value Units Date/Time    Protime-INR [[id number]]  (Normal) Collected: 02/10/24 1505    Lab Status: Final result Specimen: Blood from Arm, Right Updated: 02/10/24 1553     Protime 13.6 seconds      INR 0.98    Basic metabolic panel [[id number]]  (Abnormal) Collected: 02/10/24 1505    Lab Status: Final result Specimen: Blood from Arm, Right Updated: 02/10/24 1542     Sodium 137 mmol/L      Potassium 4.8 mmol/L      Chloride 103 mmol/L      CO2 26 mmol/L      ANION GAP 8 mmol/L      BUN 21 mg/dL      Creatinine 0.90 mg/dL      Glucose 201 mg/dL      Calcium 9.8 mg/dL      eGFR 80 ml/min/1.73sq m     Narrative:      National Kidney Disease Foundation guidelines for Chronic Kidney Disease (CKD):     Stage 1 with normal or high GFR (GFR > 90 mL/min/1.73 square meters)     Stage 2 Mild CKD (GFR = 60-89 mL/min/1.73 square meters)    Stage 3A Moderate CKD (GFR = 45-59 mL/min/1.73 square meters)    Stage 3B Moderate CKD (GFR = 30-44 mL/min/1.73 square meters)    Stage 4 Severe CKD (GFR = 15-29 mL/min/1.73 square meters)    Stage 5 End Stage CKD (GFR <15 mL/min/1.73 square meters)  Note: GFR calculation is accurate only with a steady state creatinine    CBC and differential [[id number]] Collected: 02/10/24 1505    Lab Status: Final result Specimen: Blood from Arm, Right Updated: 02/10/24 1519     WBC 6.07 Thousand/uL      RBC 4.47 Million/uL      Hemoglobin 12.4 g/dL      Hematocrit 38.2 %      MCV 86 fL      MCH 27.7 pg      MCHC 32.5 g/dL      RDW 13.9 %      MPV 9.6 fL      Platelets 243 Thousands/uL      nRBC 0 /100 WBCs      Neutrophils Relative 47 %      Immat GRANS % 0 %      Lymphocytes Relative 37 %      Monocytes Relative 10 %      Eosinophils Relative 5 %      Basophils Relative 1 %      Neutrophils Absolute 2.82 Thousands/µL      Immature Grans Absolute 0.02 Thousand/uL      Lymphocytes Absolute 2.26 Thousands/µL      Monocytes Absolute 0.59 Thousand/µL      Eosinophils Absolute 0.33 Thousand/µL      Basophils Absolute 0.05 Thousands/µL                    VAS lower limb venous duplex study, unilateral/limited    (Results Pending)         Procedures  Procedures      ED Course  ED Course as of 02/10/24 1555   Sat Feb 10, 2024   1512 Per US tech:   negative for acute DVT/SVT in the right LE. 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   1520 CBC and differential  WNL, H/H stable.                                    Wells' Criteria for DVT      Flowsheet Row Most Recent Value   Wells' Criteria for DVT    Active cancer Treatment or palliation within 6 months 0 Filed at: 02/10/2024 1348   Bedridden recently >3 days or major surgery within 12 weeks 1 Filed at: 02/10/2024 1348   Calf swelling >3 cm compared to the other leg 0 Filed at: 02/10/2024 1348   Entire leg swollen --  "  Collateral (nonvaricose) superficial veins present 1 Filed at: 02/10/2024 1348   Localized tenderness along the deep venous system 1 Filed at: 02/10/2024 1348   Pitting edema, confined to symptomatic leg 0 Filed at: 02/10/2024 1348   Paralysis, paresis, or recent plaster immobilization of the lower extremity 0 Filed at: 02/10/2024 1348   Previously documented DVT 1 Filed at: 02/10/2024 1348   Alternative diagnosis to DVT as likely or more likely 0 Filed at: 02/10/2024 1348   Wells DVT Critera Score 4 Filed at: 02/10/2024 1348            Medical Decision Making  Patient with history as above presented to triage with CC of \" Patient presents with:  Leg Pain: Pt noticed right lower leg pain onset last night, swelling, bruising. Denies injury/trauma   \"    Hx obtained from pt and spouse    78-year-old male with complex medical history including CAD, DM, recent osteomyelitis, CKD, DVT presenting to the ED with complaints of right lower extremity pain and swelling.  No injury or trauma per patient.  History of DVT in the past.  No chest pain or shortness of breath will evaluate with right lower extremity Doppler ultrasound.  Differential diagnosis includes DVT, SVT, contusion, sprain, hematoma doubt cellulitis, abscess, lipoma, compartment syndrome.  No chest pain, shortness of breath, hemoptysis doubt PE at this time.    Doppler negative for acute DVT/SVT, positive for chronic thrombus and peroneal vein.  At this time suspect hematoma given appearance from undisclosed injury versus ruptured varicose veins.  Patient's right lower extremity wrapped in Ace bandage, and advised warm compresses along with supportive care/RICE at home.  Blood work initially ordered to establish baseline patient would require anticoagulation and given his recent medical problems.  Pain improved after Tylenol.  CBC, CMP and INR unremarkable.  Patient advised to follow-up with his PMD and agreeable discharge plan.    Patient was nontoxic " appearing and stable. Exam as above. Ambulatory and Tolerating PO.     I have independently ordered, reviewed and interpreted the following: labs and/or imaging studies listed above  Reviewed external records including notes, and prior labs/imaging results    Consideration was given for admission, but the patient was stable for outpatient management.    Disposition: Discussed need for follow up with their primary doctor or specialist to review all results, including incidental findings as above. Patient discharged with explanation of ED workup and diagnosis, instructions on how to obtain outpatient follow up, care instructions at home, and strict return precautions if patient develops new or worsening symptoms. Patients questions answered and agreeable with discharge plan.     See ED Course for further MDM.      PLEASE NOTE:  This encounter was completed utilizing the [employer] Direct Speech Voice Recognition Software. Grammatical errors, random word insertions, pronoun errors and incomplete sentences are occasional inherent consequences of the system due to software limitations, ambient noise and hardware issues.These may be missed by proof reading prior to affixing electronic signature. Any questions or concerns about the content, text or information contained within the body of this dictation should be directly addressed to the physician for clarification. Please do not hesitate to call me directly if you have any questions or concerns.      Amount and/or Complexity of Data Reviewed  Independent Historian: spouse  External Data Reviewed: labs and notes.  Labs: ordered. Decision-making details documented in ED Course.    Risk  OTC drugs.          Disposition  Final diagnoses:   Leg hematoma, right, initial encounter     Time reflects when diagnosis was documented in both MDM as applicable and the Disposition within this note       Time User Action Codes Description Comment    2/10/2024  3:19 PM Pablito Bro  Add [S80.11XA] Leg hematoma, right, initial encounter           ED Disposition       ED Disposition   Discharge    Condition   Stable    Date/Time   Sat Feb 10, 2024 4204    Comment   Milton Ortiz discharge to home/self care.                   Follow-up Information       Follow up With Specialties Details Why Contact Info    Shade Gordon DO Family Medicine  Please call tomorrow to schedule an appointment [address] [place] [address] [address]  [place] [id number] [phone]              Patient's Medications   Discharge Prescriptions    No medications on file     No discharge procedures on file.    PDMP Review         Value Time User    PDMP Reviewed  Yes 12/29/2023  6:14 PM Sherice Denney DO             ED Provider  Attending physically available and evaluated Milton Ortiz. I managed the patient along with the ED Attending.    Electronically Signed by           Pablito Bro DO  02/10/24 6533

## 2024-02-10 NOTE — ED ATTENDING ATTESTATION
2/10/2024  I, Nadine Zambrano MD, saw and evaluated the patient. I have discussed the patient with the resident/non-physician practitioner and agree with the resident's/non-physician practitioner's findings, Plan of Care, and MDM as documented in the resident's/non-physician practitioner's note, except where noted. All available labs and Radiology studies were reviewed.  I was present for key portions of any procedure(s) performed by the resident/non-physician practitioner and I was immediately available to provide assistance.       At this point I agree with the current assessment done in the Emergency Department.  I have conducted an independent evaluation of this patient a history and physical is as follows:    78-year-old male with history of CAD, CKD, DM, prior DVT no longer on anticoagulation, recently admitted to [facility] in December 2023.  Patient had a prolonged medical admission where he was eventually diagnosed with malignant otitis externa.  He was noted to have a GI bleed during that admission requiring transfusion of 1 unit of packed red blood cells.  The patient was initially discharged to a rehab facility but is subsequently been transition back home.  He presents for evaluation today due to an area of painful swelling to his right shin.  He first noticed it last night.  Area is exquisitely tender to the touch.  Also has some tenderness to the right calf.  Denies chest pain.  He reports baseline shortness of breath since his recent hospitalization that is not worse than his new baseline.  He does report generalized weakness as well as fatigue but denies blood in his stool or black tarry stools.    Of note, the patient had a recent outpatient bone scan showing continuing presence of osteomyelitis.  Per documentation in his medical chart from ENT he will be treated with outpatient oral ciprofloxacin and follow-up with them in 1 month in their office.    Physical Exam  Constitutional:        General: He is not in acute distress.     Appearance: He is well-developed. He is not diaphoretic.   HENT:      Head: Normocephalic and atraumatic.      Right Ear: External ear normal.      Left Ear: External ear normal.      Nose: Nose normal.   Eyes:      Conjunctiva/sclera: Conjunctivae normal.   Cardiovascular:      Rate and Rhythm: Normal rate and regular rhythm.      Heart sounds: Normal heart sounds. No murmur heard.     No friction rub. No gallop.   Pulmonary:      Effort: Pulmonary effort is normal. No respiratory distress.      Breath sounds: Normal breath sounds. No wheezing or rales.   Abdominal:      General: Bowel sounds are normal. There is no distension.      Palpations: Abdomen is soft.      Tenderness: There is no abdominal tenderness. There is no guarding.   Musculoskeletal:         General: No deformity. Normal range of motion.      Cervical back: Normal range of motion and neck supple.      Comments: Approximately 3 cm area of painful swelling over the R anterior shin with faint overlying ecchymosis. TTP over the R calf without swelling or skin changes. 2+ pedal pulses bilaterally   Skin:     General: Skin is warm and dry.   Neurological:      Mental Status: He is alert and oriented to person, place, and time.      Motor: No abnormal muscle tone.   Psychiatric:         Mood and Affect: Mood normal.               ED Course  ED Course as of 02/10/24 1545   Sat Feb 10, 2024   1518 Lower extremity duplex is negative for acute DVT or superficial thrombophlebitis to the RLE. Patient does have chronic thrombus noted in 1 peroneal vein. No indication to restart anticoagulation at this time. There is no erythema or fluctuance located over the area of swelling to the shin though there is faint overlying ecchymosis. Suspect small hematoma vs. Ruptured varicose vein. Recommend warm compresses.    1521 Hgb 12.4. Doubt symptomatic anemia as the cause of the pt's fatigue. No leukocytosis.    1543 Patient is  stable for discharge home with return precautions discussed.          Critical Care Time  Procedures

## 2024-02-12 RX ORDER — CIPROFLOXACIN 750 MG/1
TABLET, FILM COATED ORAL
Qty: 18 TABLET | OUTPATIENT
Start: 2024-02-12

## 2024-02-12 NOTE — TELEPHONE ENCOUNTER
Called and LM to pharmacy that the pt is no longer under out care and please send script to PCP Shade Gordon.

## 2024-02-21 PROBLEM — H60.22 ACUTE MALIGNANT OTITIS EXTERNA OF LEFT EAR: Status: RESOLVED | Noted: 2023-12-15 | Resolved: 2024-02-21

## 2024-02-21 PROBLEM — H61.23 EXCESSIVE CERUMEN IN EAR CANAL, BILATERAL: Status: RESOLVED | Noted: 2024-01-08 | Resolved: 2024-02-21

## 2024-09-01 ENCOUNTER — HOSPITAL ENCOUNTER (INPATIENT)
Facility: HOSPITAL | Age: 79
LOS: 2 days | Discharge: HOME | DRG: 871 | End: 2024-09-04
Attending: EMERGENCY MEDICINE | Admitting: INTERNAL MEDICINE
Payer: MEDICARE

## 2024-09-01 ENCOUNTER — APPOINTMENT (EMERGENCY)
Dept: RADIOLOGY | Facility: HOSPITAL | Age: 79
DRG: 871 | End: 2024-09-01
Payer: MEDICARE

## 2024-09-01 DIAGNOSIS — R26.2 AMBULATORY DYSFUNCTION: ICD-10-CM

## 2024-09-01 DIAGNOSIS — U07.1 COVID: Primary | ICD-10-CM

## 2024-09-01 DIAGNOSIS — R53.1 GENERALIZED WEAKNESS: ICD-10-CM

## 2024-09-01 PROBLEM — A41.9 SEPSIS (HCC): Status: ACTIVE | Noted: 2024-09-01

## 2024-09-01 LAB
ALBUMIN SERPL BCG-MCNC: 4.1 G/DL (ref 3.5–5)
ALP SERPL-CCNC: 55 U/L (ref 34–104)
ALT SERPL W P-5'-P-CCNC: 15 U/L (ref 7–52)
ANION GAP SERPL CALCULATED.3IONS-SCNC: 7 MMOL/L (ref 4–13)
AST SERPL W P-5'-P-CCNC: 19 U/L (ref 13–39)
BASOPHILS # BLD AUTO: 0.04 THOUSANDS/ÂΜL (ref 0–0.1)
BASOPHILS NFR BLD AUTO: 1 % (ref 0–1)
BILIRUB SERPL-MCNC: 0.92 MG/DL (ref 0.2–1)
BNP SERPL-MCNC: 193 PG/ML (ref 0–100)
BUN SERPL-MCNC: 21 MG/DL (ref 5–25)
CALCIUM SERPL-MCNC: 9.1 MG/DL (ref 8.4–10.2)
CHLORIDE SERPL-SCNC: 104 MMOL/L (ref 96–108)
CK SERPL-CCNC: 109 U/L (ref 39–308)
CO2 SERPL-SCNC: 28 MMOL/L (ref 21–32)
CREAT SERPL-MCNC: 1.09 MG/DL (ref 0.6–1.3)
D DIMER PPP FEU-MCNC: 0.48 UG/ML FEU
EOSINOPHIL # BLD AUTO: 0.14 THOUSAND/ÂΜL (ref 0–0.61)
EOSINOPHIL NFR BLD AUTO: 2 % (ref 0–6)
ERYTHROCYTE [DISTWIDTH] IN BLOOD BY AUTOMATED COUNT: 13.5 % (ref 11.6–15.1)
FLUAV RNA RESP QL NAA+PROBE: NEGATIVE
FLUBV RNA RESP QL NAA+PROBE: NEGATIVE
GFR SERPL CREATININE-BSD FRML MDRD: 63 ML/MIN/1.73SQ M
GLUCOSE SERPL-MCNC: 132 MG/DL (ref 65–140)
GLUCOSE SERPL-MCNC: 136 MG/DL (ref 65–140)
HCT VFR BLD AUTO: 38.1 % (ref 36.5–49.3)
HGB BLD-MCNC: 12.5 G/DL (ref 12–17)
IMM GRANULOCYTES # BLD AUTO: 0.04 THOUSAND/UL (ref 0–0.2)
IMM GRANULOCYTES NFR BLD AUTO: 1 % (ref 0–2)
INR PPP: 0.99 (ref 0.85–1.19)
LACTATE SERPL-SCNC: 1.3 MMOL/L (ref 0.5–2)
LYMPHOCYTES # BLD AUTO: 0.65 THOUSANDS/ÂΜL (ref 0.6–4.47)
LYMPHOCYTES NFR BLD AUTO: 9 % (ref 14–44)
MCH RBC QN AUTO: 29 PG (ref 26.8–34.3)
MCHC RBC AUTO-ENTMCNC: 32.8 G/DL (ref 31.4–37.4)
MCV RBC AUTO: 88 FL (ref 82–98)
MONOCYTES # BLD AUTO: 0.52 THOUSAND/ÂΜL (ref 0.17–1.22)
MONOCYTES NFR BLD AUTO: 7 % (ref 4–12)
NEUTROPHILS # BLD AUTO: 5.64 THOUSANDS/ÂΜL (ref 1.85–7.62)
NEUTS SEG NFR BLD AUTO: 80 % (ref 43–75)
NRBC BLD AUTO-RTO: 0 /100 WBCS
PLATELET # BLD AUTO: 157 THOUSANDS/UL (ref 149–390)
PMV BLD AUTO: 9.4 FL (ref 8.9–12.7)
POTASSIUM SERPL-SCNC: 4.4 MMOL/L (ref 3.5–5.3)
PROCALCITONIN SERPL-MCNC: <0.05 NG/ML
PROT SERPL-MCNC: 6.8 G/DL (ref 6.4–8.4)
PROTHROMBIN TIME: 13.8 SECONDS (ref 12.3–15)
RBC # BLD AUTO: 4.31 MILLION/UL (ref 3.88–5.62)
RSV RNA RESP QL NAA+PROBE: NEGATIVE
SARS-COV-2 RNA RESP QL NAA+PROBE: POSITIVE
SODIUM SERPL-SCNC: 139 MMOL/L (ref 135–147)
WBC # BLD AUTO: 7.03 THOUSAND/UL (ref 4.31–10.16)

## 2024-09-01 PROCEDURE — XW033E5 INTRODUCTION OF REMDESIVIR ANTI-INFECTIVE INTO PERIPHERAL VEIN, PERCUTANEOUS APPROACH, NEW TECHNOLOGY GROUP 5: ICD-10-PCS | Performed by: INTERNAL MEDICINE

## 2024-09-01 PROCEDURE — 85379 FIBRIN DEGRADATION QUANT: CPT | Performed by: NURSE PRACTITIONER

## 2024-09-01 PROCEDURE — 84145 PROCALCITONIN (PCT): CPT | Performed by: NURSE PRACTITIONER

## 2024-09-01 PROCEDURE — 99285 EMERGENCY DEPT VISIT HI MDM: CPT

## 2024-09-01 PROCEDURE — 83605 ASSAY OF LACTIC ACID: CPT | Performed by: NURSE PRACTITIONER

## 2024-09-01 PROCEDURE — 93005 ELECTROCARDIOGRAM TRACING: CPT

## 2024-09-01 PROCEDURE — 85610 PROTHROMBIN TIME: CPT | Performed by: NURSE PRACTITIONER

## 2024-09-01 PROCEDURE — 1124F ACP DISCUSS-NO DSCNMKR DOCD: CPT | Performed by: EMERGENCY MEDICINE

## 2024-09-01 PROCEDURE — 83036 HEMOGLOBIN GLYCOSYLATED A1C: CPT | Performed by: NURSE PRACTITIONER

## 2024-09-01 PROCEDURE — 0241U HB NFCT DS VIR RESP RNA 4 TRGT: CPT

## 2024-09-01 PROCEDURE — 71045 X-RAY EXAM CHEST 1 VIEW: CPT

## 2024-09-01 PROCEDURE — 87040 BLOOD CULTURE FOR BACTERIA: CPT

## 2024-09-01 PROCEDURE — 83880 ASSAY OF NATRIURETIC PEPTIDE: CPT | Performed by: NURSE PRACTITIONER

## 2024-09-01 PROCEDURE — 96365 THER/PROPH/DIAG IV INF INIT: CPT

## 2024-09-01 PROCEDURE — 82948 REAGENT STRIP/BLOOD GLUCOSE: CPT

## 2024-09-01 PROCEDURE — 85025 COMPLETE CBC W/AUTO DIFF WBC: CPT

## 2024-09-01 PROCEDURE — 99222 1ST HOSP IP/OBS MODERATE 55: CPT | Performed by: NURSE PRACTITIONER

## 2024-09-01 PROCEDURE — 82550 ASSAY OF CK (CPK): CPT | Performed by: NURSE PRACTITIONER

## 2024-09-01 PROCEDURE — 36415 COLL VENOUS BLD VENIPUNCTURE: CPT

## 2024-09-01 PROCEDURE — 99285 EMERGENCY DEPT VISIT HI MDM: CPT | Performed by: EMERGENCY MEDICINE

## 2024-09-01 PROCEDURE — 80053 COMPREHEN METABOLIC PANEL: CPT

## 2024-09-01 RX ORDER — ACETAMINOPHEN 325 MG/1
650 TABLET ORAL EVERY 6 HOURS PRN
Status: DISCONTINUED | OUTPATIENT
Start: 2024-09-01 | End: 2024-09-04 | Stop reason: HOSPADM

## 2024-09-01 RX ORDER — INSULIN LISPRO 100 [IU]/ML
1-5 INJECTION, SOLUTION INTRAVENOUS; SUBCUTANEOUS
Status: DISCONTINUED | OUTPATIENT
Start: 2024-09-01 | End: 2024-09-04 | Stop reason: HOSPADM

## 2024-09-01 RX ORDER — KETOROLAC TROMETHAMINE 30 MG/ML
15 INJECTION, SOLUTION INTRAMUSCULAR; INTRAVENOUS ONCE
Status: DISCONTINUED | OUTPATIENT
Start: 2024-09-01 | End: 2024-09-01

## 2024-09-01 RX ORDER — LANOLIN ALCOHOL/MO/W.PET/CERES
3 CREAM (GRAM) TOPICAL
Status: DISCONTINUED | OUTPATIENT
Start: 2024-09-01 | End: 2024-09-04 | Stop reason: HOSPADM

## 2024-09-01 RX ORDER — HEPARIN SODIUM 5000 [USP'U]/ML
5000 INJECTION, SOLUTION INTRAVENOUS; SUBCUTANEOUS EVERY 8 HOURS SCHEDULED
Status: DISCONTINUED | OUTPATIENT
Start: 2024-09-01 | End: 2024-09-04 | Stop reason: HOSPADM

## 2024-09-01 RX ORDER — INSULIN LISPRO 100 [IU]/ML
1-6 INJECTION, SOLUTION INTRAVENOUS; SUBCUTANEOUS
Status: DISCONTINUED | OUTPATIENT
Start: 2024-09-02 | End: 2024-09-04 | Stop reason: HOSPADM

## 2024-09-01 RX ORDER — BENZONATATE 100 MG/1
100 CAPSULE ORAL 3 TIMES DAILY PRN
Status: DISCONTINUED | OUTPATIENT
Start: 2024-09-01 | End: 2024-09-04 | Stop reason: HOSPADM

## 2024-09-01 RX ORDER — AMOXICILLIN 250 MG
2 CAPSULE ORAL
Status: DISCONTINUED | OUTPATIENT
Start: 2024-09-01 | End: 2024-09-04 | Stop reason: HOSPADM

## 2024-09-01 RX ORDER — PRAVASTATIN SODIUM 40 MG
40 TABLET ORAL
Status: DISCONTINUED | OUTPATIENT
Start: 2024-09-01 | End: 2024-09-04 | Stop reason: HOSPADM

## 2024-09-01 RX ORDER — ACETAMINOPHEN 10 MG/ML
1000 INJECTION, SOLUTION INTRAVENOUS ONCE
Status: COMPLETED | OUTPATIENT
Start: 2024-09-01 | End: 2024-09-01

## 2024-09-01 RX ORDER — ASPIRIN 81 MG/1
81 TABLET, CHEWABLE ORAL DAILY
Status: DISCONTINUED | OUTPATIENT
Start: 2024-09-02 | End: 2024-09-04 | Stop reason: HOSPADM

## 2024-09-01 RX ORDER — SODIUM CHLORIDE, SODIUM LACTATE, POTASSIUM CHLORIDE, CALCIUM CHLORIDE 600; 310; 30; 20 MG/100ML; MG/100ML; MG/100ML; MG/100ML
50 INJECTION, SOLUTION INTRAVENOUS CONTINUOUS
Status: DISPENSED | OUTPATIENT
Start: 2024-09-01 | End: 2024-09-02

## 2024-09-01 RX ORDER — INSULIN LISPRO 100 [IU]/ML
10 INJECTION, SOLUTION INTRAVENOUS; SUBCUTANEOUS
Status: DISCONTINUED | OUTPATIENT
Start: 2024-09-01 | End: 2024-09-01

## 2024-09-01 RX ORDER — FERROUS SULFATE 325(65) MG
325 TABLET ORAL
Status: DISCONTINUED | OUTPATIENT
Start: 2024-09-02 | End: 2024-09-04 | Stop reason: HOSPADM

## 2024-09-01 RX ORDER — INSULIN GLARGINE 100 [IU]/ML
16 INJECTION, SOLUTION SUBCUTANEOUS
Status: DISCONTINUED | OUTPATIENT
Start: 2024-09-01 | End: 2024-09-04 | Stop reason: HOSPADM

## 2024-09-01 RX ORDER — INSULIN LISPRO 100 [IU]/ML
10 INJECTION, SOLUTION INTRAVENOUS; SUBCUTANEOUS
Status: DISCONTINUED | OUTPATIENT
Start: 2024-09-02 | End: 2024-09-04 | Stop reason: HOSPADM

## 2024-09-01 RX ORDER — INSULIN LISPRO 100 [IU]/ML
10 INJECTION, SOLUTION INTRAVENOUS; SUBCUTANEOUS
COMMUNITY

## 2024-09-01 RX ADMIN — HEPARIN SODIUM 5000 UNITS: 5000 INJECTION INTRAVENOUS; SUBCUTANEOUS at 22:28

## 2024-09-01 RX ADMIN — ACETAMINOPHEN 650 MG: 325 TABLET ORAL at 22:28

## 2024-09-01 RX ADMIN — INSULIN GLARGINE 16 UNITS: 100 INJECTION, SOLUTION SUBCUTANEOUS at 22:28

## 2024-09-01 RX ADMIN — ACETAMINOPHEN 1000 MG: 10 INJECTION INTRAVENOUS at 18:55

## 2024-09-01 RX ADMIN — Medication 3 MG: at 22:28

## 2024-09-01 RX ADMIN — PRAVASTATIN SODIUM 40 MG: 40 TABLET ORAL at 22:28

## 2024-09-01 RX ADMIN — REMDESIVIR 200 MG: 100 INJECTION, POWDER, LYOPHILIZED, FOR SOLUTION INTRAVENOUS at 22:27

## 2024-09-01 NOTE — ED PROVIDER NOTES
History  Chief Complaint   Patient presents with    Fall     Pt presents to the ED generalized weakness. Pt arrives via EMSfrom home, states he was getting ready to go to the bathroom when he started to feel weak, slid himself to the ground.     Weakness - Generalized     79-year-old male presenting to the ED for complaint of flulike symptoms.  Patient states headache started last night but today he started having the fevers, chills, weakness.  Patient's headache is described as dull and throbbing.  Patient is also endorsing myalgias.  Patient was at the beach last week and wife had a runny nose and common cold type illness over the past few days.  Patient has had COVID in the past and states that this feels similar.        Prior to Admission Medications   Prescriptions Last Dose Informant Patient Reported? Taking?   Cholecalciferol (Vitamin D3) 1.25 MG (27289 UT) CAPS 9/1/2024  Yes Yes   Sig: Take 10,000 Units by mouth daily   acetaminophen (TYLENOL) 650 mg CR tablet 9/1/2024  Yes Yes   Sig: Take 975 mg by mouth every 8 (eight) hours as needed for moderate pain, mild pain or headaches   aspirin 81 MG tablet 9/1/2024  Yes Yes   Sig: Take 81 mg by mouth daily   ferrous sulfate 325 (65 Fe) mg tablet 9/1/2024  Yes Yes   Sig: Take 325 mg by mouth daily with breakfast Once daily in the am and once daily at 1400 while taking Ciprofloxacin then return to once daily in the am.   insulin glargine (LANTUS) 100 units/mL subcutaneous injection 8/31/2024  Yes Yes   Sig: Inject 16 Units under the skin daily at bedtime AM administration   insulin lispro (HumALOG/ADMELOG) 100 units/mL injection   Yes Yes   Sig: Inject 10 Units under the skin daily after dinner   nitroglycerin (NITROSTAT) 0.4 mg SL tablet More than a month  Yes No   Sig: Place 0.4 mg under the tongue every 5 (five) minutes as needed for chest pain   pravastatin (PRAVACHOL) 40 mg tablet 8/31/2024  No Yes   Sig: TAKE 1 TABLET BY MOUTH EVERY NIGHT AT BEDTIME       Facility-Administered Medications: None       Past Medical History:   Diagnosis Date    Allergic     Chronic leg pain     Resolved 10/16/2014     Coronary artery disease     Diabetes mellitus (HCC)     Diabetes mellitus type II, uncontrolled     Last assessed 8/10/2014     Diabetes mellitus with neurological manifestation (HCC)     Last assessed 10/2/2014     Type 2 diabetes mellitus with hyperglycemia (HCC)     Last assessed 7/9/2013     Varicose veins of both lower extremities     Last assessed 10/11/2010        Past Surgical History:   Procedure Laterality Date    CORONARY STENT PLACEMENT      2 stents placed    VARICOSE VEIN SURGERY      15 yrs. ago both legs.        Family History   Problem Relation Age of Onset    Diabetes Father     COPD Daughter     Breast cancer additional onset Daughter     Diabetes Mother     Heart disease Mother 60     I have reviewed and agree with the history as documented.    E-Cigarette/Vaping     E-Cigarette/Vaping Substances     Social History     Tobacco Use    Smoking status: Never    Smokeless tobacco: Never    Tobacco comments:     Former smoker - As per [employer]    Substance Use Topics    Alcohol use: Never    Drug use: Never        Review of Systems   Constitutional:  Positive for chills and fever.   HENT:  Negative for trouble swallowing and voice change.    Respiratory:  Positive for cough. Negative for chest tightness and shortness of breath.    Cardiovascular:  Negative for chest pain and palpitations.   Gastrointestinal:  Negative for abdominal pain, constipation, diarrhea, nausea and vomiting.   Genitourinary:  Negative for dysuria.   Musculoskeletal:  Positive for myalgias. Negative for arthralgias, back pain and joint swelling.   Neurological:  Positive for weakness and headaches. Negative for dizziness.   Psychiatric/Behavioral:  Negative for agitation and confusion.        Physical Exam  ED Triage Vitals [09/01/24 1815]   Temperature Pulse Respirations Blood  Pressure SpO2   (S) (!) 102.8 °F (39.3 °C) 80 (S) (!) 32 (!) 190/67 95 %      Temp Source Heart Rate Source Patient Position - Orthostatic VS BP Location FiO2 (%)   Oral Monitor Sitting Right arm --      Pain Score       2             Orthostatic Vital Signs  Vitals:    09/01/24 1900 09/01/24 1915 09/01/24 2030 09/01/24 2148   BP: 127/66  108/58 117/60   Pulse: 78 71 67 73   Patient Position - Orthostatic VS:           Physical Exam  Constitutional:       Appearance: Normal appearance.   HENT:      Head: Normocephalic and atraumatic.      Right Ear: Tympanic membrane, ear canal and external ear normal.      Left Ear: Tympanic membrane, ear canal and external ear normal.      Nose: Nose normal.      Mouth/Throat:      Pharynx: Oropharynx is clear.   Eyes:      Extraocular Movements: Extraocular movements intact.      Conjunctiva/sclera: Conjunctivae normal.      Pupils: Pupils are equal, round, and reactive to light.   Cardiovascular:      Rate and Rhythm: Normal rate.      Pulses: Normal pulses.      Heart sounds: Normal heart sounds.   Pulmonary:      Effort: Pulmonary effort is normal.      Breath sounds: Normal breath sounds.   Abdominal:      General: Bowel sounds are normal.      Palpations: Abdomen is soft.   Musculoskeletal:         General: Normal range of motion.      Cervical back: Normal range of motion.   Skin:     General: Skin is warm.      Capillary Refill: Capillary refill takes less than 2 seconds.   Neurological:      General: No focal deficit present.      Mental Status: He is alert. He is disoriented.   Psychiatric:         Mood and Affect: Mood normal.         Behavior: Behavior normal.         ED Medications  Medications   acetaminophen (TYLENOL) tablet 650 mg (has no administration in time range)   senna-docusate sodium (SENOKOT S) 8.6-50 mg per tablet 2 tablet (has no administration in time range)   melatonin tablet 3 mg (has no administration in time range)   heparin (porcine) subcutaneous  injection 5,000 Units (has no administration in time range)   insulin lispro (HumALOG/ADMELOG) 100 units/mL subcutaneous injection 1-6 Units (has no administration in time range)   insulin lispro (HumALOG/ADMELOG) 100 units/mL subcutaneous injection 1-5 Units (has no administration in time range)   benzonatate (TESSALON PERLES) capsule 100 mg (has no administration in time range)   aspirin chewable tablet 81 mg (has no administration in time range)   Cholecalciferol (VITAMIN D3) tablet 2,000 Units (has no administration in time range)   ferrous sulfate tablet 325 mg (has no administration in time range)   insulin glargine (LANTUS) subcutaneous injection 16 Units 0.16 mL (has no administration in time range)   insulin lispro (HumALOG/ADMELOG) 100 units/mL subcutaneous injection 10 Units (has no administration in time range)   pravastatin (PRAVACHOL) tablet 40 mg (has no administration in time range)   lactated ringers infusion (has no administration in time range)   remdesivir (Veklury) 200 mg in sodium chloride 0.9 % 290 mL IVPB (has no administration in time range)     Followed by   remdesivir (Veklury) 100 mg in sodium chloride 0.9 % 270 mL IVPB (has no administration in time range)   acetaminophen (Ofirmev) injection 1,000 mg (0 mg Intravenous Stopped 9/1/24 1922)       Diagnostic Studies  Results Reviewed       Procedure Component Value Units Date/Time    B-Type Natriuretic Peptide(BNP) [[id number]] Collected: 09/01/24 1820    Lab Status: In process Specimen: Blood from Arm, Left Updated: 09/01/24 2212    D-dimer, quantitative [[id number]] Collected: 09/01/24 1820    Lab Status: In process Specimen: Blood from Arm, Left Updated: 09/01/24 2211    CK [[id number]] Collected: 09/01/24 1820    Lab Status: In process Specimen: Blood from Arm, Left Updated: 09/01/24 2211    Procalcitonin [[id number]]  (Normal) Collected: 09/01/24 1820    Lab Status: Final result Specimen: Blood from Arm, Left Updated: 09/01/24 214      Procalcitonin <0.05 ng/ml     Hemoglobin A1c w/EAG Estimation (Prechecked if no A1C within 90 days) [[id number]] Collected: 09/01/24 1820    Lab Status: In process Specimen: Blood from Arm, Left Updated: 09/01/24 2126    Protime-INR [[id number]]     Lab Status: No result Specimen: Blood     APTT [[id number]]     Lab Status: No result Specimen: Blood     Lactic acid, plasma (w/reflex if result > 2.0) [[id number]]     Lab Status: No result Specimen: Blood     COVID/FLU/RSV [[id number]]  (Abnormal) Collected: 09/01/24 1832    Lab Status: Final result Specimen: Nares from Nose Updated: 09/01/24 1929     SARS-CoV-2 Positive     INFLUENZA A PCR Negative     INFLUENZA B PCR Negative     RSV PCR Negative    Narrative:      This test has been performed using the CoV-2/Flu/RSV plus assay on the [employer] GeneXpert platform. This test has been validated by the  and verified by the performing laboratory.     This test is designed to amplify and detect the following: nucleocapsid (N), envelope (E), and RNA-dependent RNA polymerase (RdRP) genes of the SARS-CoV-2 genome; matrix (M), basic polymerase (PB2), and acidic protein (PA) segments of the influenza A genome; matrix (M) and non-structural protein (NS) segments of the influenza B genome, and the nucleocapsid genes of RSV A and RSV B.     Positive results are indicative of the presence of Flu A, Flu B, RSV, and/or SARS-CoV-2 RNA. Positive results for SARS-CoV-2 or suspected novel influenza should be reported to state, local, or federal health departments according to local reporting requirements.      All results should be assessed in conjunction with clinical presentation and other laboratory markers for clinical management.     FOR PEDIATRIC PATIENTS - copy/paste COVID Guidelines URL to browser: [email or web address]       Blood culture #1 [[id number]] Collected: 09/01/24 1826    Lab Status: In process Specimen: Blood  from Arm, Right Updated: 09/01/24 1904    Blood culture #2 [[id number]] Collected: 09/01/24 1820    Lab Status: In process Specimen: Blood from Arm, Left Updated: 09/01/24 1904    Comprehensive metabolic panel [[id number]] Collected: 09/01/24 1820    Lab Status: Final result Specimen: Blood from Arm, Left Updated: 09/01/24 1858     Sodium 139 mmol/L      Potassium 4.4 mmol/L      Chloride 104 mmol/L      CO2 28 mmol/L      ANION GAP 7 mmol/L      BUN 21 mg/dL      Creatinine 1.09 mg/dL      Glucose 136 mg/dL      Calcium 9.1 mg/dL      AST 19 U/L      ALT 15 U/L      Alkaline Phosphatase 55 U/L      Total Protein 6.8 g/dL      Albumin 4.1 g/dL      Total Bilirubin 0.92 mg/dL      eGFR 63 ml/min/1.73sq m     Narrative:      [employer] Kid[employer] guidelines for Chronic Kidney Disease (CKD):     Stage 1 with normal or high GFR (GFR > 90 mL/min/1.73 square meters)    Stage 2 Mild CKD (GFR = 60-89 mL/min/1.73 square meters)    Stage 3A Moderate CKD (GFR = 45-59 mL/min/1.73 square meters)    Stage 3B Moderate CKD (GFR = 30-44 mL/min/1.73 square meters)    Stage 4 Severe CKD (GFR = 15-29 mL/min/1.73 square meters)    Stage 5 End Stage CKD (GFR <15 mL/min/1.73 square meters)  Note: GFR calculation is accurate only with a steady state creatinine    CBC and differential [[id number]]  (Abnormal) Collected: 09/01/24 1820    Lab Status: Final result Specimen: Blood from Arm, Left Updated: 09/01/24 1842     WBC 7.03 Thousand/uL      RBC 4.31 Million/uL      Hemoglobin 12.5 g/dL      Hematocrit 38.1 %      MCV 88 fL      MCH 29.0 pg      MCHC 32.8 g/dL      RDW 13.5 %      MPV 9.4 fL      Platelets 157 Thousands/uL      nRBC 0 /100 WBCs      Segmented % 80 %      Immature Grans % 1 %      Lymphocytes % 9 %      Monocytes % 7 %      Eosinophils Relative 2 %      Basophils Relative 1 %      Absolute Neutrophils 5.64 Thousands/µL      Absolute Immature Grans 0.04 Thousand/uL      Absolute Lymphocytes 0.65 Thousands/µL       Absolute Monocytes 0.52 Thousand/µL      Eosinophils Absolute 0.14 Thousand/µL      Basophils Absolute 0.04 Thousands/µL     UA w Reflex to Microscopic w Reflex to Culture [[id number]]     Lab Status: No result Specimen: Urine                    XR chest 1 view portable   ED Interpretation by Ramon Arriola MD (09/01 2215)   Treat infiltrate likely viral pneumonia.            Procedures  Procedures      ED Course  ED Course as of 09/01/24 2215   Sun Sep 01, 2024   1844 WBC: 7.03  Reassuring and mild elevation in segmented.     1907 Comprehensive metabolic panel  Unremarkable and reassuring CMP   1941 SARS-COV-2(!): Positive   1941 Reassessed patient.  Will get new temperature.  Patient declined Toradol given past history of GI bleed.                             SBIRT 22yo+      Flowsheet Row Most Recent Value   Initial Alcohol Screen: US AUDIT-C     1. How often do you have a drink containing alcohol? 0 Filed at: 09/01/2024 1833   2. How many drinks containing alcohol do you have on a typical day you are drinking?  0 Filed at: 09/01/2024 1833   3a. Male UNDER 65: How often do you have five or more drinks on one occasion? 0 Filed at: 09/01/2024 1833   3b. FEMALE Any Age, or MALE 65+: How often do you have 4 or more drinks on one occassion? 0 Filed at: 09/01/2024 1833   Audit-C Score 0 Filed at: 09/01/2024 1833   AMADO: How many times in the past year have you...    Used an illegal drug or used a prescription medication for non-medical reasons? Never Filed at: 09/01/2024 1833                  Medical Decision Making  79-year-old male presenting to the ED with viral-like symptoms.    Differential at this time includes flu, COVID, RSV, other viral illness.  Patient is at risk for infectious process.  Will send off CBC and CMP as well as flu RSV COVID tests.    Please see ED course for interpretation of results and continued management.    Following workup, patient was found to be COVID-positive.  When ambulating  patient was noted that patient was very weak and unable to ambulate without assistance.  Patient's family states she has had this problem in the past and has required multiple SNF admissions previously.  At this time was decided that admission to inpatient for case management continued monitoring and observation would be appropriate.  I reached out to [facility] who accepted patient for admission.    Amount and/or Complexity of Data Reviewed  Labs:  Decision-making details documented in ED Course.  Radiology: ordered.    Risk  Prescription drug management.  Decision regarding hospitalization.          Disposition  Final diagnoses:   COVID   Ambulatory dysfunction   Generalized weakness     Time reflects when diagnosis was documented in both MDM as applicable and the Disposition within this note       Time User Action Codes Description Comment    9/1/2024  7:33 PM Ramon Rodríguez [U07.1] COVID     9/1/2024  7:57 PM Raymond Corbett [R26.2] Ambulatory dysfunction     9/1/2024  7:57 PM Raymond Corbett [R53.1] Generalized weakness           ED Disposition       ED Disposition   Admit    Condition   Stable    Date/Time   Sun Sep 1, 2024 1957    Comment                  Follow-up Information    None         Current Discharge Medication List        CONTINUE these medications which have NOT CHANGED    Details   acetaminophen (TYLENOL) 650 mg CR tablet Take 975 mg by mouth every 8 (eight) hours as needed for moderate pain, mild pain or headaches      aspirin 81 MG tablet Take 81 mg by mouth daily      Cholecalciferol (Vitamin D3) 1.25 MG (25126 UT) CAPS Take 10,000 Units by mouth daily      ferrous sulfate 325 (65 Fe) mg tablet Take 325 mg by mouth daily with breakfast Once daily in the am and once daily at 1400 while taking Ciprofloxacin then return to once daily in the am.      insulin glargine (LANTUS) 100 units/mL subcutaneous injection Inject 16 Units under the skin daily at bedtime AM administration      insulin  lispro (HumALOG/ADMELOG) 100 units/mL injection Inject 10 Units under the skin daily after dinner      pravastatin (PRAVACHOL) 40 mg tablet TAKE 1 TABLET BY MOUTH EVERY NIGHT AT BEDTIME  Qty: 90 tablet, Refills: 0    Comments: **Patient requests 90 days supply**  Associated Diagnoses: Hyperlipidemia, unspecified hyperlipidemia type      nitroglycerin (NITROSTAT) 0.4 mg SL tablet Place 0.4 mg under the tongue every 5 (five) minutes as needed for chest pain           No discharge procedures on file.    PDMP Review         Value Time User    PDMP Reviewed  Yes 12/29/2023  6:14 PM Sherice Denney DO             ED Provider  Attending physically available and evaluated Milton Ortiz. I managed the patient along with the ED Attending.    Electronically Signed by           Ramon Arriola MD  09/01/24 1977

## 2024-09-02 LAB
ABO GROUP BLD: NORMAL
ALBUMIN SERPL BCG-MCNC: 3.7 G/DL (ref 3.5–5)
ALP SERPL-CCNC: 47 U/L (ref 34–104)
ALT SERPL W P-5'-P-CCNC: 16 U/L (ref 7–52)
ANION GAP SERPL CALCULATED.3IONS-SCNC: 5 MMOL/L (ref 4–13)
APTT PPP: 52 SECONDS (ref 23–34)
AST SERPL W P-5'-P-CCNC: 20 U/L (ref 13–39)
BACTERIA UR QL AUTO: ABNORMAL /HPF
BASOPHILS # BLD AUTO: 0.04 THOUSANDS/ÂΜL (ref 0–0.1)
BASOPHILS NFR BLD AUTO: 1 % (ref 0–1)
BILIRUB SERPL-MCNC: 0.56 MG/DL (ref 0.2–1)
BILIRUB UR QL STRIP: NEGATIVE
BUN SERPL-MCNC: 22 MG/DL (ref 5–25)
CALCIUM SERPL-MCNC: 8.5 MG/DL (ref 8.4–10.2)
CHLORIDE SERPL-SCNC: 104 MMOL/L (ref 96–108)
CLARITY UR: CLEAR
CO2 SERPL-SCNC: 27 MMOL/L (ref 21–32)
COLOR UR: YELLOW
CREAT SERPL-MCNC: 1.17 MG/DL (ref 0.6–1.3)
EOSINOPHIL # BLD AUTO: 0.03 THOUSAND/ÂΜL (ref 0–0.61)
EOSINOPHIL NFR BLD AUTO: 1 % (ref 0–6)
ERYTHROCYTE [DISTWIDTH] IN BLOOD BY AUTOMATED COUNT: 13.8 % (ref 11.6–15.1)
EST. AVERAGE GLUCOSE BLD GHB EST-MCNC: 171 MG/DL
GFR SERPL CREATININE-BSD FRML MDRD: 58 ML/MIN/1.73SQ M
GLUCOSE SERPL-MCNC: 103 MG/DL (ref 65–140)
GLUCOSE SERPL-MCNC: 110 MG/DL (ref 65–140)
GLUCOSE SERPL-MCNC: 113 MG/DL (ref 65–140)
GLUCOSE SERPL-MCNC: 123 MG/DL (ref 65–140)
GLUCOSE SERPL-MCNC: 135 MG/DL (ref 65–140)
GLUCOSE SERPL-MCNC: 154 MG/DL (ref 65–140)
GLUCOSE UR STRIP-MCNC: ABNORMAL MG/DL
HBA1C MFR BLD: 7.6 %
HCT VFR BLD AUTO: 37.2 % (ref 36.5–49.3)
HGB BLD-MCNC: 12.1 G/DL (ref 12–17)
HGB UR QL STRIP.AUTO: NEGATIVE
HYALINE CASTS #/AREA URNS LPF: ABNORMAL /LPF
IMM GRANULOCYTES # BLD AUTO: 0.02 THOUSAND/UL (ref 0–0.2)
IMM GRANULOCYTES NFR BLD AUTO: 0 % (ref 0–2)
KETONES UR STRIP-MCNC: NEGATIVE MG/DL
LEUKOCYTE ESTERASE UR QL STRIP: NEGATIVE
LYMPHOCYTES # BLD AUTO: 0.73 THOUSANDS/ÂΜL (ref 0.6–4.47)
LYMPHOCYTES NFR BLD AUTO: 13 % (ref 14–44)
MAGNESIUM SERPL-MCNC: 1.8 MG/DL (ref 1.9–2.7)
MCH RBC QN AUTO: 29 PG (ref 26.8–34.3)
MCHC RBC AUTO-ENTMCNC: 32.5 G/DL (ref 31.4–37.4)
MCV RBC AUTO: 89 FL (ref 82–98)
MONOCYTES # BLD AUTO: 0.6 THOUSAND/ÂΜL (ref 0.17–1.22)
MONOCYTES NFR BLD AUTO: 11 % (ref 4–12)
MUCOUS THREADS UR QL AUTO: ABNORMAL
NEUTROPHILS # BLD AUTO: 4.28 THOUSANDS/ÂΜL (ref 1.85–7.62)
NEUTS SEG NFR BLD AUTO: 74 % (ref 43–75)
NITRITE UR QL STRIP: NEGATIVE
NON-SQ EPI CELLS URNS QL MICRO: ABNORMAL /HPF
NRBC BLD AUTO-RTO: 0 /100 WBCS
PH UR STRIP.AUTO: 5.5 [PH]
PLATELET # BLD AUTO: 143 THOUSANDS/UL (ref 149–390)
PMV BLD AUTO: 9.8 FL (ref 8.9–12.7)
POTASSIUM SERPL-SCNC: 4.4 MMOL/L (ref 3.5–5.3)
PROT SERPL-MCNC: 6 G/DL (ref 6.4–8.4)
PROT UR STRIP-MCNC: ABNORMAL MG/DL
RBC # BLD AUTO: 4.17 MILLION/UL (ref 3.88–5.62)
RBC #/AREA URNS AUTO: ABNORMAL /HPF
RH BLD: POSITIVE
SODIUM SERPL-SCNC: 136 MMOL/L (ref 135–147)
SP GR UR STRIP.AUTO: 1.04 (ref 1–1.03)
UROBILINOGEN UR STRIP-ACNC: <2 MG/DL
WBC # BLD AUTO: 5.7 THOUSAND/UL (ref 4.31–10.16)
WBC #/AREA URNS AUTO: ABNORMAL /HPF

## 2024-09-02 PROCEDURE — 97167 OT EVAL HIGH COMPLEX 60 MIN: CPT

## 2024-09-02 PROCEDURE — 83735 ASSAY OF MAGNESIUM: CPT | Performed by: INTERNAL MEDICINE

## 2024-09-02 PROCEDURE — 85730 THROMBOPLASTIN TIME PARTIAL: CPT | Performed by: NURSE PRACTITIONER

## 2024-09-02 PROCEDURE — 80053 COMPREHEN METABOLIC PANEL: CPT | Performed by: INTERNAL MEDICINE

## 2024-09-02 PROCEDURE — 85025 COMPLETE CBC W/AUTO DIFF WBC: CPT | Performed by: INTERNAL MEDICINE

## 2024-09-02 PROCEDURE — 99232 SBSQ HOSP IP/OBS MODERATE 35: CPT | Performed by: INTERNAL MEDICINE

## 2024-09-02 PROCEDURE — 81001 URINALYSIS AUTO W/SCOPE: CPT

## 2024-09-02 PROCEDURE — 86901 BLOOD TYPING SEROLOGIC RH(D): CPT | Performed by: INTERNAL MEDICINE

## 2024-09-02 PROCEDURE — 86900 BLOOD TYPING SEROLOGIC ABO: CPT | Performed by: INTERNAL MEDICINE

## 2024-09-02 PROCEDURE — 82948 REAGENT STRIP/BLOOD GLUCOSE: CPT

## 2024-09-02 RX ORDER — KETOROLAC TROMETHAMINE 30 MG/ML
15 INJECTION, SOLUTION INTRAMUSCULAR; INTRAVENOUS ONCE
Status: COMPLETED | OUTPATIENT
Start: 2024-09-02 | End: 2024-09-02

## 2024-09-02 RX ORDER — MAGNESIUM SULFATE HEPTAHYDRATE 40 MG/ML
2 INJECTION, SOLUTION INTRAVENOUS ONCE
Status: COMPLETED | OUTPATIENT
Start: 2024-09-02 | End: 2024-09-02

## 2024-09-02 RX ORDER — ACETAMINOPHEN 325 MG/1
325 TABLET ORAL ONCE
Status: COMPLETED | OUTPATIENT
Start: 2024-09-02 | End: 2024-09-02

## 2024-09-02 RX ORDER — ONDANSETRON 2 MG/ML
4 INJECTION INTRAMUSCULAR; INTRAVENOUS EVERY 8 HOURS PRN
Status: DISCONTINUED | OUTPATIENT
Start: 2024-09-02 | End: 2024-09-04 | Stop reason: HOSPADM

## 2024-09-02 RX ADMIN — BENZONATATE 100 MG: 100 CAPSULE ORAL at 01:30

## 2024-09-02 RX ADMIN — HEPARIN SODIUM 5000 UNITS: 5000 INJECTION INTRAVENOUS; SUBCUTANEOUS at 21:49

## 2024-09-02 RX ADMIN — SODIUM CHLORIDE, SODIUM LACTATE, POTASSIUM CHLORIDE, AND CALCIUM CHLORIDE 50 ML/HR: .6; .31; .03; .02 INJECTION, SOLUTION INTRAVENOUS at 00:03

## 2024-09-02 RX ADMIN — INSULIN LISPRO 10 UNITS: 100 INJECTION, SOLUTION INTRAVENOUS; SUBCUTANEOUS at 19:36

## 2024-09-02 RX ADMIN — FERROUS SULFATE TAB 325 MG (65 MG ELEMENTAL FE) 325 MG: 325 (65 FE) TAB at 08:42

## 2024-09-02 RX ADMIN — ONDANSETRON 4 MG: 2 INJECTION INTRAMUSCULAR; INTRAVENOUS at 00:30

## 2024-09-02 RX ADMIN — MAGNESIUM SULFATE HEPTAHYDRATE 2 G: 40 INJECTION, SOLUTION INTRAVENOUS at 06:52

## 2024-09-02 RX ADMIN — REMDESIVIR 100 MG: 100 INJECTION, POWDER, LYOPHILIZED, FOR SOLUTION INTRAVENOUS at 21:49

## 2024-09-02 RX ADMIN — ACETAMINOPHEN 650 MG: 325 TABLET ORAL at 14:40

## 2024-09-02 RX ADMIN — INSULIN GLARGINE 16 UNITS: 100 INJECTION, SOLUTION SUBCUTANEOUS at 21:54

## 2024-09-02 RX ADMIN — KETOROLAC TROMETHAMINE 15 MG: 30 INJECTION, SOLUTION INTRAMUSCULAR; INTRAVENOUS at 00:29

## 2024-09-02 RX ADMIN — PRAVASTATIN SODIUM 40 MG: 40 TABLET ORAL at 21:50

## 2024-09-02 RX ADMIN — ACETAMINOPHEN 325 MG: 325 TABLET ORAL at 06:49

## 2024-09-02 RX ADMIN — Medication 2000 UNITS: at 08:42

## 2024-09-02 RX ADMIN — ASPIRIN 81 MG 81 MG: 81 TABLET ORAL at 08:42

## 2024-09-02 RX ADMIN — HEPARIN SODIUM 5000 UNITS: 5000 INJECTION INTRAVENOUS; SUBCUTANEOUS at 14:40

## 2024-09-02 RX ADMIN — ACETAMINOPHEN 650 MG: 325 TABLET ORAL at 05:30

## 2024-09-02 RX ADMIN — HEPARIN SODIUM 5000 UNITS: 5000 INJECTION INTRAVENOUS; SUBCUTANEOUS at 05:30

## 2024-09-02 NOTE — OCCUPATIONAL THERAPY NOTE
Occupational Therapy Evaluation     Patient Name: Milton Ortiz  Today's Date: 9/2/2024  Problem List  Principal Problem:    COVID-19  Active Problems:    Type 2 diabetes mellitus with hyperglycemia (HCC)    Ambulatory dysfunction    Sepsis (HCC)    Past Medical History  Past Medical History:   Diagnosis Date    Allergic     Chronic leg pain     Resolved 10/16/2014     Coronary artery disease     Diabetes mellitus (HCC)     Diabetes mellitus type II, uncontrolled     Last assessed 8/10/2014     Diabetes mellitus with neurological manifestation (HCC)     Last assessed 10/2/2014     Type 2 diabetes mellitus with hyperglycemia (HCC)     Last assessed 7/9/2013     Varicose veins of both lower extremities     Last assessed 10/11/2010      Past Surgical History  Past Surgical History:   Procedure Laterality Date    CORONARY STENT PLACEMENT      2 stents placed    VARICOSE VEIN SURGERY      15 yrs. ago both legs.              09/02/24 1329   OT Last Visit   OT Visit Date 09/02/24   Note Type   Note type Evaluation   Pain Assessment   Pain Assessment Tool 0-10   Pain Score No Pain   Restrictions/Precautions   Weight Bearing Precautions Per Order No   Home Living   Type of Home House  (bilevel)   Home Layout Two level  (0 ZOYA from garage, 7+7 to 2nd floor. FFSU with full bathroom is possible)   Bathroom Shower/Tub Walk-in shower   Bathroom Toilet Standard   Bathroom Equipment Grab bars in shower;Shower chair   Bathroom Accessibility Accessible   Home Equipment Walker   Additional Comments use of RW at baseline - pt reports that the HHOT had turned the wheels to the inside of the walker so it would fit in the bathroom door   Prior Function   Level of [place] Independent with ADLs   Lives With Spouse  (+ granddaughter)   Receives Help From Family   IADLs Family/Friend/Other provides transportation;Family/Friend/Other provides meals;Family/Friend/Other provides medication management  (wife completes IADL tasks. Pt able  "to participate in small tasks like putting away laundry)   Falls in the last 6 months 1 to 4  (1: reason for admission)   Vocational Retired   Lifestyle   Autonomy PTA pt living with wife + granddaughter in 2SH, pt (I) with ADLs and (A) with IADLs, (+)falls, (-)drives, use of RW at baseline   Reciprocal Relationships supportive wife, granddaughter just graduated from PA school - is busy applying for jobs   Service to Others retired   Intrinsic Gratification enjoys going out to eat. Reports that [place] and [place] food   General   Additional Pertinent History Admit due fall in bathroom. Found to be COVID+, septic. 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   Family/Caregiver Present Yes  (wife Lakshmi at bedside)   Subjective   Subjective \"I just don't feel good\"   ADL   Eating Assistance 5  Supervision/Setup   Grooming Assistance 5  Supervision/Setup   UB Bathing Assistance 4  Minimal Assistance   LB Bathing Assistance 2  Maximal Assistance   UB Dressing Assistance 4  Minimal Assistance   LB Dressing Assistance 1  Total Assistance   LB Dressing Deficit Don/doff R sock;Don/doff L sock   Toileting Assistance  2  Maximal Assistance   Bed Mobility   Supine to Sit 5  Supervision   Additional items Increased time required;Verbal cues;Bedrails   Transfers   Sit to Stand 3  Moderate assistance  (progressing to min A x1 with consecutive trial)   Additional items Assist x 1;Increased time required;Verbal cues   Stand to Sit 4  Minimal assistance   Additional items Assist x 1;Increased time required;Verbal cues   Additional Comments use of RW   Functional Mobility   Functional Mobility 4  Minimal assistance   Additional Comments Ax1, side stepping towards HOB. Unable to complete further functional mobility due to fatigue   Additional items Rolling walker   Balance   Static Sitting Fair +   Dynamic Sitting Fair -   Static Standing Poor +   Dynamic Standing Poor +   Ambulatory " Poor +   Activity Tolerance   Activity Tolerance Patient limited by fatigue   Medical Staff Made Aware MARIO CURRY Assessment   RUE Assessment WFL   LUE Assessment   LUE Assessment WFL   Hand Function   Gross Motor Coordination Functional   Fine Motor Coordination Functional   Cognition   Overall Cognitive Status Impaired   Arousal/Participation Alert;Cooperative   Attention Attends with cues to redirect   Orientation Level Oriented to person;Oriented to place;Oriented to situation;Disoriented to time   Memory Decreased short term memory;Decreased recall of recent events   Following Commands Follows one step commands with increased time or repetition   Comments pleasant and cooperative, initially lethargic and requiring cuing for alertness. Demonstrating improved alertness at end of session   Assessment   Limitation Decreased ADL status;Decreased Safe judgement during ADL;Decreased cognition;Decreased endurance;Decreased self-care trans;Decreased high-level ADLs  (impaired balance, fxnl mobility, act carmen, fxnl reach, standing carmen, strength, attention to task, safety awareness, pacing, problem solving, learning new tasks, response time, flat affect)   Prognosis Good   Assessment Pt is a 79 y.o. male seen for OT evaluation s/p admission to [facility] on 9/1/2024 due to fall in bathroom. Diagnosed with COVID-19. Personal and env factors supporting pt at time of IE include (I) PLOF, supportive wife, and FFSU. Personal and env factors inhibiting engagement in occupations include advanced age. Performance deficits that affect the pt’s occupational performance can be seen above. Due to pt's current functional limitations and medical complications pt is functioning below baseline. Pt would benefit from continued skilled OT treatment in order to maximize safety, independence and overall performance with ADLs, functional mobility, functional transfers, and cognition in order to achieve highest level of function.   Goals    Patient Goals to feel better   LTG Time Frame 10-14   Long Term Goal see goals listed below   Plan   Treatment Interventions ADL retraining;Functional transfer training;Endurance training;Cognitive reorientation;Patient/family training;Equipment evaluation/education;Compensatory technique education;Energy conservation;Activityengagement   Goal Expiration Date 09/12/24   OT Treatment Day 0   OT Frequency 3-5x/wk   Discharge Recommendation   Rehab Resource Intensity Level, OT I (Maximum Resource Intensity)   AM-PAC Daily Activity Inpatient   Lower Body Dressing 2   Bathing 2   Toileting 2   Upper Body Dressing 3   Grooming 3   Eating 3   Daily Activity Raw Score 15   Daily Activity Standardized Score (Calc for Raw Score >=11) 34.69   AM-PAC Applied Cognition Inpatient   Following a Speech/Presentation 3   Understanding Ordinary Conversation 3   Taking Medications 2   Remembering Where Things Are Placed or Put Away 3   Remembering List of 4-5 Errands 2   Taking Care of Complicated Tasks 2   Applied Cognition Raw Score 15   Applied Cognition Standardized Score 33.54   End of Consult   Patient Position at End of Consult Supine;Bed/Chair alarm activated;All needs within reach     GOALS:      -Patient will perform grooming tasks standing at sink with overall Mod I in order to increase overall independence    -Patient will be Mod I with UB dressing using AE and AD as needed in order to increase (I) with ADLs    -Patient will be Mod I with UB bathing using AE and AD as needed in order to increase (I) with ADLs    -Patient will be Mod I with LB dressing with use of AE and AD as needed in order to increase (I) with ADLs    -Patient will be Mod I with LB bathing with use of AE and AD as needed in order to increase (I) with ADLs    -Patient will complete toileting w/ Mod I w/ G hygiene/thoroughness in order to reduce caregiver burden    -Patient will demonstrate Mod I with bed mobility for ability to manage own comfort and  initiate OOB tasks.     -Patient will perform functional transfers with Mod I to/from all surfaces using DME as needed in order to increase (I) with functional tasks    -Patient will be Mod I with functional mobility to/from bathroom for increased independence with toileting tasks    -Patient will tolerate therapeutic activities for greater than 30 min, in order to increase tolerance for functional activities.         The patient's raw score on the -PAC Daily Activity Inpatient Short Form is 15. A raw score of less than 19 suggests the patient may benefit from discharge to post-acute rehabilitation services. HOWEVER please refer to the recommendation of the Occupational Therapist for safe discharge planning.    Lyric Griffin MS, OTR/L

## 2024-09-02 NOTE — PLAN OF CARE
Problem: Potential for Falls  Goal: Patient will remain free of falls  Description: INTERVENTIONS:  - Educate patient/family on patient safety including physical limitations  - Instruct patient to call for assistance with activity   - Consult OT/PT to assist with strengthening/mobility   - Keep Call bell within reach  - Keep bed low and locked with side rails adjusted as appropriate  - Keep care items and personal belongings within reach  - Initiate and maintain comfort rounds  - Make Fall Risk Sign visible to staff  - Offer Toileting every 2 Hours, in advance of need  - Initiate/Maintain bed/chair alarm  - Obtain necessary fall risk management equipment:   - Apply yellow socks and bracelet for high fall risk patients  - Consider moving patient to room near nurses station  Outcome: Progressing     Problem: PAIN - ADULT  Goal: Verbalizes/displays adequate comfort level or baseline comfort level  Description: Interventions:  - Encourage patient to monitor pain and request assistance  - Assess pain using appropriate pain scale  - Administer analgesics based on type and severity of pain and evaluate response  - Implement non-pharmacological measures as appropriate and evaluate response  - Consider cultural and social influences on pain and pain management  - Notify physician/advanced practitioner if interventions unsuccessful or patient reports new pain  Outcome: Progressing     Problem: INFECTION - ADULT  Goal: Absence or prevention of progression during hospitalization  Description: INTERVENTIONS:  - Assess and monitor for signs and symptoms of infection  - Monitor lab/diagnostic results  - Monitor all insertion sites, i.e. indwelling lines, tubes, and drains  - Monitor endotracheal if appropriate and nasal secretions for changes in amount and color  - [place] appropriate cooling/warming therapies per order  - Administer medications as ordered  - Instruct and encourage patient and family to use good hand hygiene  technique  - Identify and instruct in appropriate isolation precautions for identified infection/condition  Outcome: Progressing     Problem: SAFETY ADULT  Goal: Patient will remain free of falls  Description: INTERVENTIONS:  - Educate patient/family on patient safety including physical limitations  - Instruct patient to call for assistance with activity   - Consult OT/PT to assist with strengthening/mobility   - Keep Call bell within reach  - Keep bed low and locked with side rails adjusted as appropriate  - Keep care items and personal belongings within reach  - Initiate and maintain comfort rounds  - Make Fall Risk Sign visible to staff  - Offer Toileting every 2 Hours, in advance of need  - Initiate/Maintain bed/chair alarm  - Obtain necessary fall risk management equipment:   - Apply yellow socks and bracelet for high fall risk patients  - Consider moving patient to room near nurses station  Outcome: Progressing     Problem: DISCHARGE PLANNING  Goal: Discharge to home or other facility with appropriate resources  Description: INTERVENTIONS:  - Identify barriers to discharge w/patient and caregiver  - Arrange for needed discharge resources and transportation as appropriate  - Identify discharge learning needs (meds, wound care, etc.)  - Arrange for interpretive services to assist at discharge as needed  - Refer to Case Management Department for coordinating discharge planning if the patient needs post-hospital services based on physician/advanced practitioner order or complex needs related to functional status, cognitive ability, or social support system  Outcome: Progressing     Problem: Knowledge Deficit  Goal: Patient/family/caregiver demonstrates understanding of disease process, treatment plan, medications, and discharge instructions  Description: Complete learning assessment and assess knowledge base.  Interventions:  - Provide teaching at level of understanding  - Provide teaching via preferred learning  methods  Outcome: Progressing     Problem: RESPIRATORY - ADULT  Goal: Achieves optimal ventilation and oxygenation  Description: INTERVENTIONS:  - Assess for changes in respiratory status  - Assess for changes in mentation and behavior  - Position to facilitate oxygenation and minimize respiratory effort  - Oxygen administered by appropriate delivery if ordered  - Initiate smoking cessation education as indicated  - Encourage broncho-pulmonary hygiene including cough, deep breathe, Incentive Spirometry  - Assess the need for suctioning and aspirate as needed  - Assess and instruct to report SOB or any respiratory difficulty  - Respiratory Therapy support as indicated  Outcome: Progressing     Problem: Prexisting or High Potential for Compromised Skin Integrity  Goal: Skin integrity is maintained or improved  Description: INTERVENTIONS:  - Identify patients at risk for skin breakdown  - Assess and monitor skin integrity  - Assess and monitor nutrition and hydration status  - Monitor labs   - Assess for incontinence   - Turn and reposition patient  - Assist with mobility/ambulation  - Relieve pressure over bony prominences  - Avoid friction and shearing  - Provide appropriate hygiene as needed including keeping skin clean and dry  - Evaluate need for skin moisturizer/barrier cream  - Collaborate with interdisciplinary team   - Patient/family teaching  - Consider wound care consult   Outcome: Progressing

## 2024-09-02 NOTE — H&P
[facility]  H&P  Name: Milton Ortiz 79 y.o. male I MRN: [id number]  Unit/Bed#: S -01 I Date of Admission: 9/1/2024   Date of Service: 9/1/2024 I Hospital Day: 0      Assessment & Plan   * COVID-19  Assessment & Plan  Presented from home with general weakness, assisted fall.  Found to be COVID-positive 9/1.  Oxygen requirement: None.  Hold steroids  Risk factors: Type 2 diabetes.  Start remdesivir  Supportive care    Ambulatory dysfunction  Assessment & Plan  Likely worsened with acute infection.  Ambulates with walker at baseline.  Fall precaution, PT OT    Sepsis (HCC)  Assessment & Plan  Fever, tachypnea.  Lactate normal.  Procalcitonin pending.  Source: COVID  Await procalcitonin, for now we will hold antibiotics  Repeat CBC, procalcitonin in a.m.    Type 2 diabetes mellitus with hyperglycemia (HCC)  Assessment & Plan  Lab Results   Component Value Date    HGBA1C 7.7 (H) 05/20/2024     Update A1c  Continue PTA:   Lantus 16 units HS  Humalog 10 units with dinner  Accu-Chek, SSI           VTE Pharmacologic Prophylaxis: VTE Score: 6 High Risk (Score >/= 5) - Pharmacological DVT Prophylaxis Ordered: heparin. Sequential Compression Devices Ordered.  Code Status: Level 1 - Full Code   Discussion with family: Updated  (wife) at bedside.    Anticipated Length of Stay: Patient will be admitted on an observation basis with an anticipated length of stay of less than 2 midnights secondary to covid, ambulatory dysfunction, weakness.    Total Time Spent on Date of Encounter in care of patient:  mins. This time was spent on one or more of the following: performing physical exam; counseling and coordination of care; obtaining or reviewing history; documenting in the medical record; reviewing/ordering tests, medications or procedures; communicating with other healthcare professionals and discussing with patient's family/caregivers.    Chief Complaint: weakness, fall    History of  Present Illness:  Milton Ortiz is a 79 y.o. 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  He guided himself to the ground and did not sustain injury.  He was found to be covid positive.  Fortunately patient has no oxygen requirements.  We will start remdesivir due to high risk.  Reviewed with patient and wife at bedside who are both agreeable.      Review of Systems:  Review of Systems   Constitutional:  Positive for appetite change, chills, fatigue and fever. Negative for unexpected weight change.   Neurological:  Positive for weakness.   All other systems reviewed and are negative.      Past Medical and Surgical History:   Past Medical History:   Diagnosis Date    Allergic     Chronic leg pain     Resolved 10/16/2014     Coronary artery disease     Diabetes mellitus (HCC)     Diabetes mellitus type II, uncontrolled     Last assessed 8/10/2014     Diabetes mellitus with neurological manifestation (HCC)     Last assessed 10/2/2014     Type 2 diabetes mellitus with hyperglycemia (HCC)     Last assessed 7/9/2013     Varicose veins of both lower extremities     Last assessed 10/11/2010        Past Surgical History:   Procedure Laterality Date    CORONARY STENT PLACEMENT      2 stents placed    VARICOSE VEIN SURGERY      15 yrs. ago both legs.        Meds/Allergies:  Prior to Admission medications    Medication Sig Start Date End Date Taking? Authorizing Provider   acetaminophen (TYLENOL) 650 mg CR tablet Take 975 mg by mouth every 8 (eight) hours as needed for moderate pain, mild pain or headaches   Yes Historical Provider, MD   aspirin 81 MG tablet Take 81 mg by mouth daily   Yes Historical Provider, MD   Cholecalciferol (Vitamin D3) 1.25 MG (87939 UT) CAPS Take 10,000 Units by mouth daily   Yes Historical Provider, MD   ferrous sulfate 325 (65 Fe) mg tablet Take 325 mg by  mouth daily with breakfast Once daily in the am and once daily at 1400 while taking Ciprofloxacin then return to once daily in the am. 12/30/23 12/29/24 Yes Historical Provider, MD   insulin glargine (LANTUS) 100 units/mL subcutaneous injection Inject 16 Units under the skin daily at bedtime AM administration   Yes Historical Provider, MD   insulin lispro (HumALOG/ADMELOG) 100 units/mL injection Inject 10 Units under the skin daily after dinner   Yes Historical Provider, MD   pravastatin (PRAVACHOL) 40 mg tablet TAKE 1 TABLET BY MOUTH EVERY NIGHT AT BEDTIME 1/19/24  Yes JENNIFER Puga   nitroglycerin (NITROSTAT) 0.4 mg SL tablet Place 0.4 mg under the tongue every 5 (five) minutes as needed for chest pain    Historical Provider, MD   pantoprazole (PROTONIX) 40 mg tablet Take 1 tablet (40 mg total) by mouth 2 (two) times a day before meals  Patient not taking: Reported on 9/1/2024 1/18/24 9/1/24  JENNIFER Puga   pregabalin (LYRICA) 75 mg capsule Take 1 capsule (75 mg total) by mouth 3 (three) times a day 1/31/24 9/1/24  JENNIFER Argueta     I have reviewed home medications with patient personally.    Allergies:   Allergies   Allergen Reactions    Glyburide      Rash and facial swelling      Latex     Medical Tape     Metformin     Morphine Hives    Penicillins     Prasugrel Hives    Repaglinide      Diarrhea and joint pain    Sulfa Antibiotics     Clopidogrel Rash       Social History:  Marital Status: /Civil Union   Occupation:   Patient Pre-hospital Living Situation: Home  Patient Pre-hospital Level of Mobility: walks with walker  Patient Pre-hospital Diet Restrictions:   Substance Use History:   Social History     Substance and Sexual Activity   Alcohol Use Never     Social History     Tobacco Use   Smoking Status Never   Smokeless Tobacco Never   Tobacco Comments    Former smoker - As per [employer]      Social History     Substance and Sexual Activity   Drug Use  Never       Family History:  Family History   Problem Relation Age of Onset    Diabetes Father     COPD Daughter     Breast cancer additional onset Daughter     Diabetes Mother     Heart disease Mother 60       Physical Exam:     Vitals:   Blood Pressure: 117/60 (09/01/24 2148)  Pulse: 73 (09/01/24 2148)  Temperature: 99.9 °F (37.7 °C) (09/01/24 2148)  Temp Source: Oral (09/01/24 1949)  Respirations: 19 (09/01/24 2148)  Weight - Scale: 78.3 kg (172 lb 9.9 oz) (09/01/24 1815)  SpO2: 96 % (09/01/24 2148)    Physical Exam  Constitutional:       General: He is not in acute distress.     Appearance: Normal appearance. He is not toxic-appearing.   HENT:      Head: Normocephalic and atraumatic.      Right Ear: External ear normal.      Left Ear: External ear normal.      Nose: Nose normal.      Mouth/Throat:      Pharynx: Oropharynx is clear.   Eyes:      Extraocular Movements: Extraocular movements intact.      Conjunctiva/sclera: Conjunctivae normal.   Cardiovascular:      Rate and Rhythm: Normal rate and regular rhythm.      Pulses: Normal pulses.      Heart sounds: Normal heart sounds.   Pulmonary:      Effort: Pulmonary effort is normal.      Breath sounds: Normal breath sounds.   Abdominal:      General: Bowel sounds are normal.      Palpations: Abdomen is soft.   Musculoskeletal:         General: Normal range of motion.      Cervical back: Normal range of motion.      Right lower leg: No edema.      Left lower leg: No edema.   Skin:     General: Skin is warm and dry.      Capillary Refill: Capillary refill takes less than 2 seconds.   Neurological:      General: No focal deficit present.      Mental Status: He is alert and oriented to person, place, and time.   Psychiatric:         Mood and Affect: Mood normal.         Behavior: Behavior normal.          Additional Data:     Lab Results:  Results from last 7 days   Lab Units 09/01/24  1820   WBC Thousand/uL 7.03   HEMOGLOBIN g/dL 12.5   HEMATOCRIT % 38.1   PLATELETS  Thousands/uL 157   SEGS PCT % 80*   LYMPHO PCT % 9*   MONO PCT % 7   EOS PCT % 2     Results from last 7 days   Lab Units 09/01/24  1820   SODIUM mmol/L 139   POTASSIUM mmol/L 4.4   CHLORIDE mmol/L 104   CO2 mmol/L 28   BUN mg/dL 21   CREATININE mg/dL 1.09   ANION GAP mmol/L 7   CALCIUM mg/dL 9.1   ALBUMIN g/dL 4.1   TOTAL BILIRUBIN mg/dL 0.92   ALK PHOS U/L 55   ALT U/L 15   AST U/L 19   GLUCOSE RANDOM mg/dL 136         Results from last 7 days   Lab Units 09/01/24  2151   POC GLUCOSE mg/dl 132     Lab Results   Component Value Date    HGBA1C 7.7 (H) 05/20/2024    HGBA1C 8.1 (H) 02/26/2024    HGBA1C 13.8 (H) 11/23/2023     Results from last 7 days   Lab Units 09/01/24  1820   PROCALCITONIN ng/ml <0.05       Lines/Drains:  Invasive Devices       Peripheral Intravenous Line  Duration             Peripheral IV 09/01/24 Left Antecubital <1 day    Peripheral IV 09/01/24 Right Antecubital <1 day                        Imaging: Reviewed radiology reports from this admission including: chest xray  XR chest 1 view portable    (Results Pending)       EKG and Other Studies Reviewed on Admission:   EKG: Personally Reviewed. NSR. HR 78.    ** Please Note: This note has been constructed using a voice recognition system. **

## 2024-09-02 NOTE — ED ATTENDING ATTESTATION
9/1/2024  IRaymond DO, saw and evaluated the patient. I have discussed the patient with the resident/non-physician practitioner and agree with the resident's/non-physician practitioner's findings, Plan of Care, and MDM as documented in the resident's/non-physician practitioner's note, except where noted. All available labs and Radiology studies were reviewed.  I was present for key portions of any procedure(s) performed by the resident/non-physician practitioner and I was immediately available to provide assistance.       At this point I agree with the current assessment done in the Emergency Department.  I have conducted an independent evaluation of this patient a history and physical is as follows:    ED Course  ED Course as of 09/02/24 0215   Sun Sep 01, 2024   2011 79yoM, pmhx per resident note, presenting to ER with generalized weakness, ambulatory dysfunction and slide from bed today required EMS two person assist to feet. No other trauma. See resident note for full details. Generalized gradual onset HA, fevers, myalgias starting last PM. Pt denies SOB, CP, abd pain, n/v/d, change in bowel or bladder.    Febrile, tachypneic (resolved when fever controlled), VS otherwise stable. Pt resting comfortably. AAOX3. Lungs CTA. No WOB. No tachypnea on my exam. Heart RRR. Abd soft nontender. No Wounds.    79yoM presenting to ER with ambulatory dysfunction 2/2 generalized weakness due to COVID/fever. CBC w/ lymphopenia suggestive of viral illness. No obviou bacterial markers. COVID positive here. CMP at baseline. COVID positive. Given ambulatory function/weakness, call placed to hospital medicine for obs/placement. Family aware and state pt has required SNF in the past under similar circumstances.            Critical Care Time  Procedures

## 2024-09-02 NOTE — ASSESSMENT & PLAN NOTE
Lab Results   Component Value Date    HGBA1C 7.7 (H) 05/20/2024     Update A1c  Continue PTA:   Lantus 16 units HS  Humalog 10 units with dinner  Accu-Chek, SSI

## 2024-09-02 NOTE — PLAN OF CARE
Problem: Potential for Falls  Goal: Patient will remain free of falls  Description: INTERVENTIONS:  - Educate patient/family on patient safety including physical limitations  - Instruct patient to call for assistance with activity   - Consult OT/PT to assist with strengthening/mobility   - Keep Call bell within reach  - Keep bed low and locked with side rails adjusted as appropriate  - Keep care items and personal belongings within reach  - Initiate and maintain comfort rounds  - Make Fall Risk Sign visible to staff  - Apply yellow socks and bracelet for high fall risk patients  - Consider moving patient to room near nurses station  Outcome: Progressing     Problem: PAIN - ADULT  Goal: Verbalizes/displays adequate comfort level or baseline comfort level  Description: Interventions:  - Encourage patient to monitor pain and request assistance  - Assess pain using appropriate pain scale  - Administer analgesics based on type and severity of pain and evaluate response  - Implement non-pharmacological measures as appropriate and evaluate response  - Consider cultural and social influences on pain and pain management  - Notify physician/advanced practitioner if interventions unsuccessful or patient reports new pain  Outcome: Progressing     Problem: INFECTION - ADULT  Goal: Absence or prevention of progression during hospitalization  Description: INTERVENTIONS:  - Assess and monitor for signs and symptoms of infection  - Monitor lab/diagnostic results  - Monitor all insertion sites, i.e. indwelling lines, tubes, and drains  - Monitor endotracheal if appropriate and nasal secretions for changes in amount and color  - [place] appropriate cooling/warming therapies per order  - Administer medications as ordered  - Instruct and encourage patient and family to use good hand hygiene technique  - Identify and instruct in appropriate isolation precautions for identified infection/condition  Outcome: Progressing     Problem:  SAFETY ADULT  Goal: Patient will remain free of falls  Description: INTERVENTIONS:  - Educate patient/family on patient safety including physical limitations  - Instruct patient to call for assistance with activity   - Consult OT/PT to assist with strengthening/mobility   - Keep Call bell within reach  - Keep bed low and locked with side rails adjusted as appropriate  - Keep care items and personal belongings within reach  - Initiate and maintain comfort rounds  - Make Fall Risk Sign visible to staff  - Apply yellow socks and bracelet for high fall risk patients  - Consider moving patient to room near nurses station  Outcome: Progressing     Problem: RESPIRATORY - ADULT  Goal: Achieves optimal ventilation and oxygenation  Description: INTERVENTIONS:  - Assess for changes in respiratory status  - Assess for changes in mentation and behavior  - Position to facilitate oxygenation and minimize respiratory effort  - Oxygen administered by appropriate delivery if ordered  - Initiate smoking cessation education as indicated  - Encourage broncho-pulmonary hygiene including cough, deep breathe, Incentive Spirometry  - Assess the need for suctioning and aspirate as needed  - Assess and instruct to report SOB or any respiratory difficulty  - Respiratory Therapy support as indicated  Outcome: Progressing

## 2024-09-02 NOTE — ASSESSMENT & PLAN NOTE
Presented from home with general weakness, assisted fall.  Found to be COVID-positive 9/1.  Oxygen requirement: None.  Hold steroids  Risk factors: Type 2 diabetes.    Continue remdesivir  Supportive care, gentle hydration

## 2024-09-02 NOTE — PLAN OF CARE
Problem: Potential for Falls  Goal: Patient will remain free of falls  Description: INTERVENTIONS:  - Educate patient/family on patient safety including physical limitations  - Instruct patient to call for assistance with activity   - Consult OT/PT to assist with strengthening/mobility   - Keep Call bell within reach  - Keep bed low and locked with side rails adjusted as appropriate  - Keep care items and personal belongings within reach  - Initiate and maintain comfort rounds  - Make Fall Risk Sign visible to staff  - Offer Toileting every 2 Hours, in advance of need  - Initiate/Maintain bed alarm  - Obtain necessary fall risk management equipment  - Apply yellow socks and bracelet for high fall risk patients  - Consider moving patient to room near nurses station  Outcome: Progressing     Problem: PAIN - ADULT  Goal: Verbalizes/displays adequate comfort level or baseline comfort level  Description: Interventions:  - Encourage patient to monitor pain and request assistance  - Assess pain using appropriate pain scale  - Administer analgesics based on type and severity of pain and evaluate response  - Implement non-pharmacological measures as appropriate and evaluate response  - Consider cultural and social influences on pain and pain management  - Notify physician/advanced practitioner if interventions unsuccessful or patient reports new pain  Outcome: Progressing     Problem: INFECTION - ADULT  Goal: Absence or prevention of progression during hospitalization  Description: INTERVENTIONS:  - Assess and monitor for signs and symptoms of infection  - Monitor lab/diagnostic results  - Monitor all insertion sites, i.e. indwelling lines, tubes, and drains  - Monitor endotracheal if appropriate and nasal secretions for changes in amount and color  - [place] appropriate cooling/warming therapies per order  - Administer medications as ordered  - Instruct and encourage patient and family to use good hand hygiene  technique  - Identify and instruct in appropriate isolation precautions for identified infection/condition  Outcome: Progressing     Problem: SAFETY ADULT  Goal: Patient will remain free of falls  Description: INTERVENTIONS:  - Educate patient/family on patient safety including physical limitations  - Instruct patient to call for assistance with activity   - Consult OT/PT to assist with strengthening/mobility   - Keep Call bell within reach  - Keep bed low and locked with side rails adjusted as appropriate  - Keep care items and personal belongings within reach  - Initiate and maintain comfort rounds  - Make Fall Risk Sign visible to staff  - Offer Toileting every 2 Hours, in advance of need  - Initiate/Maintain bed alarm  - Obtain necessary fall risk management equipment  - Apply yellow socks and bracelet for high fall risk patients  - Consider moving patient to room near nurses station  Outcome: Progressing     Problem: DISCHARGE PLANNING  Goal: Discharge to home or other facility with appropriate resources  Description: INTERVENTIONS:  - Identify barriers to discharge w/patient and caregiver  - Arrange for needed discharge resources and transportation as appropriate  - Identify discharge learning needs (meds, wound care, etc.)  - Arrange for interpretive services to assist at discharge as needed  - Refer to Case Management Department for coordinating discharge planning if the patient needs post-hospital services based on physician/advanced practitioner order or complex needs related to functional status, cognitive ability, or social support system  Outcome: Progressing     Problem: Knowledge Deficit  Goal: Patient/family/caregiver demonstrates understanding of disease process, treatment plan, medications, and discharge instructions  Description: Complete learning assessment and assess knowledge base.  Interventions:  - Provide teaching at level of understanding  - Provide teaching via preferred learning  methods  Outcome: Progressing     Problem: RESPIRATORY - ADULT  Goal: Achieves optimal ventilation and oxygenation  Description: INTERVENTIONS:  - Assess for changes in respiratory status  - Assess for changes in mentation and behavior  - Position to facilitate oxygenation and minimize respiratory effort  - Oxygen administered by appropriate delivery if ordered  - Initiate smoking cessation education as indicated  - Encourage broncho-pulmonary hygiene including cough, deep breathe, Incentive Spirometry  - Assess the need for suctioning and aspirate as needed  - Assess and instruct to report SOB or any respiratory difficulty  - Respiratory Therapy support as indicated  Outcome: Progressing

## 2024-09-02 NOTE — PROGRESS NOTES
[facility]  Progress Note  Name: Milton Ortiz I  MRN: [id number]  Unit/Bed#: S -01 I Date of Admission: 9/1/2024   Date of Service: 9/2/2024 I Hospital Day: 0    Assessment & Plan   * COVID-19  Assessment & Plan  Presented from home with general weakness, assisted fall.  Found to be COVID-positive 9/1.  Oxygen requirement: None.  Hold steroids  Risk factors: Type 2 diabetes.    Continue remdesivir  Supportive care, gentle hydration    Sepsis (HCC)  Assessment & Plan  Fever, tachypnea.  Lactate normal.  Procalcitonin pending.  Source: COVID  Normal Pro calcitonin  Continue to hold antibiotics    Type 2 diabetes mellitus with hyperglycemia (HCC)  Assessment & Plan  Lab Results   Component Value Date    HGBA1C 7.6 (H) 09/01/2024     Continue PTA:   Lantus 16 units HS  Humalog 10 units with dinner  Accu-Chek, SSI    Ambulatory dysfunction  Assessment & Plan  Likely worsened with acute infection.    Ambulates with walker at baseline.  Fall precaution,   PT OT             VTE Pharmacologic Prophylaxis: VTE Score: 6 High Risk (Score >/= 5) - Pharmacological DVT Prophylaxis Ordered: heparin. Sequential Compression Devices Ordered.    Mobility:   Basic Mobility Inpatient Raw Score: 13  JH-HLM Goal: 4: Move to chair/commode  JH-HLM Achieved: 2: Bed activities/Dependent transfer  JH-HLM Goal NOT achieved. Continue with multidisciplinary rounding and encourage appropriate mobility to improve upon JH-HLM goals.    Patient Centered Rounds: I performed bedside rounds with nursing staff today.   Discussions with Specialists or Other Care Team Provider: Internal medicine     Education and Discussions with Family / Patient: Attempted to update  (wife) via phone. Left voicemail.     Total Time Spent on Date of Encounter in care of patient: 30 mins. This time was spent on one or more of the following: performing physical exam; counseling and coordination of care; obtaining or reviewing  history; documenting in the medical record; reviewing/ordering tests, medications or procedures; communicating with other healthcare professionals and discussing with patient's family/caregivers.    Current Length of Stay: 0 day(s)  Current Patient Status: Observation   Certification Statement: The patient will continue to require additional inpatient hospital stay due to management of COVID.  Discharge Plan: Anticipate discharge in 24-48 hrs to home.    Code Status: Level 1 - Full Code    Subjective:   Patient is alert, oriented to time place and person.  Patient does not have any shortness of breath, feels tired and has generalized weakness.     Objective:     Vitals:   Temp (24hrs), Av.6 °F (38.1 °C), Min:98.6 °F (37 °C), Max:102.8 °F (39.3 °C)    Temp:  [98.6 °F (37 °C)-102.8 °F (39.3 °C)] 100.6 °F (38.1 °C)  HR:  [55-80] 66  Resp:  [18-32] 18  BP: (107-190)/(48-71) 107/52  SpO2:  [90 %-96 %] 91 %  Body mass index is 25.49 kg/m².     Input and Output Summary (last 24 hours):     Intake/Output Summary (Last 24 hours) at 2024 1401  Last data filed at 2024 0653  Gross per 24 hour   Intake 220 ml   Output 75 ml   Net 145 ml       Physical Exam:   Physical Exam  HENT:      Head: Normocephalic and atraumatic.      Right Ear: External ear normal.      Left Ear: External ear normal.      Nose: Nose normal.      Mouth/Throat:      Pharynx: Oropharynx is clear.   Eyes:      Extraocular Movements: Extraocular movements intact.      Conjunctiva/sclera: Conjunctivae normal.      Pupils: Pupils are equal, round, and reactive to light.   Cardiovascular:      Rate and Rhythm: Normal rate and regular rhythm.      Pulses: Normal pulses.      Heart sounds: Normal heart sounds.   Pulmonary:      Effort: Pulmonary effort is normal. No respiratory distress.      Breath sounds: Normal breath sounds.   Abdominal:      General: Bowel sounds are normal.      Palpations: Abdomen is soft.   Musculoskeletal:         General:  Normal range of motion.   Skin:     General: Skin is warm.      Capillary Refill: Capillary refill takes less than 2 seconds.   Neurological:      General: No focal deficit present.      Mental Status: He is alert and oriented to person, place, and time.          Additional Data:     Labs:  Results from last 7 days   Lab Units 09/02/24  0345   WBC Thousand/uL 5.70   HEMOGLOBIN g/dL 12.1   HEMATOCRIT % 37.2   PLATELETS Thousands/uL 143*   SEGS PCT % 74   LYMPHO PCT % 13*   MONO PCT % 11   EOS PCT % 1     Results from last 7 days   Lab Units 09/02/24  0345   SODIUM mmol/L 136   POTASSIUM mmol/L 4.4   CHLORIDE mmol/L 104   CO2 mmol/L 27   BUN mg/dL 22   CREATININE mg/dL 1.17   ANION GAP mmol/L 5   CALCIUM mg/dL 8.5   ALBUMIN g/dL 3.7   TOTAL BILIRUBIN mg/dL 0.56   ALK PHOS U/L 47   ALT U/L 16   AST U/L 20   GLUCOSE RANDOM mg/dL 154*     Results from last 7 days   Lab Units 09/01/24  2237   INR  0.99     Results from last 7 days   Lab Units 09/02/24  1115 09/02/24  0655 09/01/24  2151   POC GLUCOSE mg/dl 113 123 132     Results from last 7 days   Lab Units 09/01/24  1820   HEMOGLOBIN A1C % 7.6*     Results from last 7 days   Lab Units 09/01/24  2237 09/01/24  1820   LACTIC ACID mmol/L 1.3  --    PROCALCITONIN ng/ml  --  <0.05       Lines/Drains:  Invasive Devices       Peripheral Intravenous Line  Duration             Peripheral IV 09/01/24 Left Antecubital <1 day    Peripheral IV 09/01/24 Right Antecubital <1 day                          Imaging: Personally reviewed the following imaging: chest xray    Recent Cultures (last 7 days):   Results from last 7 days   Lab Units 09/01/24  1826 09/01/24  1820   BLOOD CULTURE  Received in Microbiology Lab. Culture in Progress. Received in Microbiology Lab. Culture in Progress.       Last 24 Hours Medication List:   Current Facility-Administered Medications   Medication Dose Route Frequency Provider Last Rate    acetaminophen  650 mg Oral Q6H PRN JENNIFER Pepe      aspirin  81  mg Oral Daily Alexus Valentino, JENNIFER      benzonatate  100 mg Oral TID PRN Alexus Valentino, JENNIFER      Cholecalciferol  2,000 Units Oral Daily JENNIFER Pepe      ferrous sulfate  325 mg Oral Daily With Breakfast JENNIFER Pepe      heparin (porcine)  5,000 Units Subcutaneous Q8H RAE Alexus Valentino, JENNIFER      insulin glargine  16 Units Subcutaneous HS Alexus Valentino, JENNIFER      insulin lispro  1-5 Units Subcutaneous HS Alexus Valentino, JENNIFER      insulin lispro  1-6 Units Subcutaneous TID AC Alexus Valentino, JENNIFER      insulin lispro  10 Units Subcutaneous After Dinner JENNIFER Pepe      melatonin  3 mg Oral HS PRN Alexus Valentino, JENNIFER      ondansetron  4 mg Intravenous Q8H PRN JENNIFER Pepe      pravastatin  40 mg Oral HS Alexus Valentino, JENNIFER      remdesivir  100 mg Intravenous Q24H Alexus Valentino, JENNIFER      senna-docusate sodium  2 tablet Oral HS PRN JENNIFER Pepe          Today, Patient Was Seen By: Myah Hammer MD    **Please Note: This note may have been constructed using a voice recognition system.**

## 2024-09-02 NOTE — ASSESSMENT & PLAN NOTE
Fever, tachypnea.  Lactate normal.  Procalcitonin pending.  Source: COVID  Normal Pro calcitonin  Continue to hold antibiotics

## 2024-09-02 NOTE — ASSESSMENT & PLAN NOTE
Fever, tachypnea.  Lactate normal.  Procalcitonin pending.  Source: COVID  Await procalcitonin, for now we will hold antibiotics  Repeat CBC, procalcitonin in a.m.

## 2024-09-02 NOTE — PLAN OF CARE
Problem: OCCUPATIONAL THERAPY ADULT  Goal: Performs self-care activities at highest level of function for planned discharge setting.  See evaluation for individualized goals.  Description: Treatment Interventions: ADL retraining, Functional transfer training, Endurance training, Cognitive reorientation, Patient/family training, Equipment evaluation/education, Compensatory technique education, Energy conservation, Activityengagement          See flowsheet documentation for full assessment, interventions and recommendations.   Note: Limitation: Decreased ADL status, Decreased Safe judgement during ADL, Decreased cognition, Decreased endurance, Decreased self-care trans, Decreased high-level ADLs (impaired balance, fxnl mobility, act carmen, fxnl reach, standing carmen, strength, attention to task, safety awareness, pacing, problem solving, learning new tasks, response time, flat affect)  Prognosis: Good  Assessment: Pt is a 79 y.o. male seen for OT evaluation s/p admission to [facility] on 9/1/2024 due to fall in bathroom. Diagnosed with COVID-19. Personal and env factors supporting pt at time of IE include (I) PLOF, supportive wife, and [employer]. Personal and env factors inhibiting engagement in occupations include advanced age. Performance deficits that affect the pt’s occupational performance can be seen above. Due to pt's current functional limitations and medical complications pt is functioning below baseline. Pt would benefit from continued skilled OT treatment in order to maximize safety, independence and overall performance with ADLs, functional mobility, functional transfers, and cognition in order to achieve highest level of function.     Rehab Resource Intensity Level, OT: I (Maximum Resource Intensity)

## 2024-09-02 NOTE — ASSESSMENT & PLAN NOTE
Lab Results   Component Value Date    HGBA1C 7.6 (H) 09/01/2024     Continue PTA:   Lantus 16 units HS  Humalog 10 units with dinner  Accu-Chek, SSI

## 2024-09-02 NOTE — ASSESSMENT & PLAN NOTE
Likely worsened with acute infection.    Ambulates with walker at baseline.  Fall precaution,   PT OT

## 2024-09-03 LAB
ANION GAP SERPL CALCULATED.3IONS-SCNC: 8 MMOL/L (ref 4–13)
ATRIAL RATE: 78 BPM
BUN SERPL-MCNC: 23 MG/DL (ref 5–25)
CALCIUM SERPL-MCNC: 8 MG/DL (ref 8.4–10.2)
CHLORIDE SERPL-SCNC: 106 MMOL/L (ref 96–108)
CO2 SERPL-SCNC: 23 MMOL/L (ref 21–32)
CREAT SERPL-MCNC: 0.98 MG/DL (ref 0.6–1.3)
ERYTHROCYTE [DISTWIDTH] IN BLOOD BY AUTOMATED COUNT: 14 % (ref 11.6–15.1)
GFR SERPL CREATININE-BSD FRML MDRD: 72 ML/MIN/1.73SQ M
GLUCOSE SERPL-MCNC: 108 MG/DL (ref 65–140)
GLUCOSE SERPL-MCNC: 150 MG/DL (ref 65–140)
GLUCOSE SERPL-MCNC: 170 MG/DL (ref 65–140)
GLUCOSE SERPL-MCNC: 74 MG/DL (ref 65–140)
GLUCOSE SERPL-MCNC: 76 MG/DL (ref 65–140)
GLUCOSE SERPL-MCNC: 86 MG/DL (ref 65–140)
HCT VFR BLD AUTO: 36.8 % (ref 36.5–49.3)
HGB BLD-MCNC: 11.7 G/DL (ref 12–17)
MCH RBC QN AUTO: 29.7 PG (ref 26.8–34.3)
MCHC RBC AUTO-ENTMCNC: 31.8 G/DL (ref 31.4–37.4)
MCV RBC AUTO: 93 FL (ref 82–98)
P AXIS: 35 DEGREES
PLATELET # BLD AUTO: 123 THOUSANDS/UL (ref 149–390)
PMV BLD AUTO: 10.6 FL (ref 8.9–12.7)
POTASSIUM SERPL-SCNC: 4 MMOL/L (ref 3.5–5.3)
PR INTERVAL: 180 MS
QRS AXIS: 0 DEGREES
QRSD INTERVAL: 76 MS
QT INTERVAL: 342 MS
QTC INTERVAL: 389 MS
RBC # BLD AUTO: 3.94 MILLION/UL (ref 3.88–5.62)
SODIUM SERPL-SCNC: 137 MMOL/L (ref 135–147)
T WAVE AXIS: 21 DEGREES
VENTRICULAR RATE: 78 BPM
WBC # BLD AUTO: 5.01 THOUSAND/UL (ref 4.31–10.16)

## 2024-09-03 PROCEDURE — 80048 BASIC METABOLIC PNL TOTAL CA: CPT

## 2024-09-03 PROCEDURE — 97163 PT EVAL HIGH COMPLEX 45 MIN: CPT

## 2024-09-03 PROCEDURE — 82948 REAGENT STRIP/BLOOD GLUCOSE: CPT

## 2024-09-03 PROCEDURE — 93010 ELECTROCARDIOGRAM REPORT: CPT | Performed by: INTERNAL MEDICINE

## 2024-09-03 PROCEDURE — 97116 GAIT TRAINING THERAPY: CPT

## 2024-09-03 PROCEDURE — 85027 COMPLETE CBC AUTOMATED: CPT

## 2024-09-03 PROCEDURE — 99232 SBSQ HOSP IP/OBS MODERATE 35: CPT | Performed by: INTERNAL MEDICINE

## 2024-09-03 RX ADMIN — HEPARIN SODIUM 5000 UNITS: 5000 INJECTION INTRAVENOUS; SUBCUTANEOUS at 13:58

## 2024-09-03 RX ADMIN — REMDESIVIR 100 MG: 100 INJECTION, POWDER, LYOPHILIZED, FOR SOLUTION INTRAVENOUS at 22:55

## 2024-09-03 RX ADMIN — FERROUS SULFATE TAB 325 MG (65 MG ELEMENTAL FE) 325 MG: 325 (65 FE) TAB at 08:06

## 2024-09-03 RX ADMIN — INSULIN GLARGINE 16 UNITS: 100 INJECTION, SOLUTION SUBCUTANEOUS at 21:30

## 2024-09-03 RX ADMIN — ASPIRIN 81 MG 81 MG: 81 TABLET ORAL at 08:06

## 2024-09-03 RX ADMIN — PHENOL 1 SPRAY: 1.5 LIQUID ORAL at 22:56

## 2024-09-03 RX ADMIN — ACETAMINOPHEN 650 MG: 325 TABLET ORAL at 06:32

## 2024-09-03 RX ADMIN — INSULIN LISPRO 1 UNITS: 100 INJECTION, SOLUTION INTRAVENOUS; SUBCUTANEOUS at 21:31

## 2024-09-03 RX ADMIN — PHENOL 1 SPRAY: 1.5 LIQUID ORAL at 18:28

## 2024-09-03 RX ADMIN — Medication 2000 UNITS: at 08:06

## 2024-09-03 RX ADMIN — HEPARIN SODIUM 5000 UNITS: 5000 INJECTION INTRAVENOUS; SUBCUTANEOUS at 21:29

## 2024-09-03 RX ADMIN — PRAVASTATIN SODIUM 40 MG: 40 TABLET ORAL at 21:29

## 2024-09-03 RX ADMIN — BENZONATATE 100 MG: 100 CAPSULE ORAL at 06:36

## 2024-09-03 RX ADMIN — ACETAMINOPHEN 650 MG: 325 TABLET ORAL at 21:29

## 2024-09-03 RX ADMIN — INSULIN LISPRO 10 UNITS: 100 INJECTION, SOLUTION INTRAVENOUS; SUBCUTANEOUS at 18:22

## 2024-09-03 RX ADMIN — HEPARIN SODIUM 5000 UNITS: 5000 INJECTION INTRAVENOUS; SUBCUTANEOUS at 05:01

## 2024-09-03 RX ADMIN — INSULIN LISPRO 1 UNITS: 100 INJECTION, SOLUTION INTRAVENOUS; SUBCUTANEOUS at 18:21

## 2024-09-03 NOTE — ASSESSMENT & PLAN NOTE
Likely worsened with acute infection.    Ambulates with walker at baseline.  Fall precaution,   OT recommends level 1  PT recommends level III

## 2024-09-03 NOTE — ASSESSMENT & PLAN NOTE
Fever, tachypnea.on admission   No fevers in the last 24 hrs  Source: COVID  Normal Pro calcitonin  Continue to hold antibiotics  Management under covid

## 2024-09-03 NOTE — PLAN OF CARE
Problem: PAIN - ADULT  Goal: Verbalizes/displays adequate comfort level or baseline comfort level  Description: Interventions:  - Encourage patient to monitor pain and request assistance  - Assess pain using appropriate pain scale  - Administer analgesics based on type and severity of pain and evaluate response  - Implement non-pharmacological measures as appropriate and evaluate response  - Consider cultural and social influences on pain and pain management  - Notify physician/advanced practitioner if interventions unsuccessful or patient reports new pain  Outcome: Progressing     Problem: INFECTION - ADULT  Goal: Absence or prevention of progression during hospitalization  Description: INTERVENTIONS:  - Assess and monitor for signs and symptoms of infection  - Monitor lab/diagnostic results  - Monitor all insertion sites, i.e. indwelling lines, tubes, and drains  - Monitor endotracheal if appropriate and nasal secretions for changes in amount and color  - [place] appropriate cooling/warming therapies per order  - Administer medications as ordered  - Instruct and encourage patient and family to use good hand hygiene technique  - Identify and instruct in appropriate isolation precautions for identified infection/condition  Outcome: Progressing     Problem: DISCHARGE PLANNING  Goal: Discharge to home or other facility with appropriate resources  Description: INTERVENTIONS:  - Identify barriers to discharge w/patient and caregiver  - Arrange for needed discharge resources and transportation as appropriate  - Identify discharge learning needs (meds, wound care, etc.)  - Arrange for interpretive services to assist at discharge as needed  - Refer to Case Management Department for coordinating discharge planning if the patient needs post-hospital services based on physician/advanced practitioner order or complex needs related to functional status, cognitive ability, or social support system  Outcome: Progressing      Problem: Knowledge Deficit  Goal: Patient/family/caregiver demonstrates understanding of disease process, treatment plan, medications, and discharge instructions  Description: Complete learning assessment and assess knowledge base.  Interventions:  - Provide teaching at level of understanding  - Provide teaching via preferred learning methods  Outcome: Progressing     Problem: RESPIRATORY - ADULT  Goal: Achieves optimal ventilation and oxygenation  Description: INTERVENTIONS:  - Assess for changes in respiratory status  - Assess for changes in mentation and behavior  - Position to facilitate oxygenation and minimize respiratory effort  - Oxygen administered by appropriate delivery if ordered  - Initiate smoking cessation education as indicated  - Encourage broncho-pulmonary hygiene including cough, deep breathe, Incentive Spirometry  - Assess the need for suctioning and aspirate as needed  - Assess and instruct to report SOB or any respiratory difficulty  - Respiratory Therapy support as indicated  Outcome: Progressing

## 2024-09-03 NOTE — ASSESSMENT & PLAN NOTE
Presented from home with general weakness, assisted fall.  Found to be COVID-positive 9//24.  Oxygen requirement: None.    Hold steroids  Risk factors: Type 2 diabetes.      PLAN  Continue remdesivir  Supportive care, gentle hydration  Tylenol for fever

## 2024-09-03 NOTE — PLAN OF CARE
Problem: Potential for Falls  Goal: Patient will remain free of falls  Description: INTERVENTIONS:  - Educate patient/family on patient safety including physical limitations  - Instruct patient to call for assistance with activity   - Consult OT/PT to assist with strengthening/mobility   - Keep Call bell within reach  - Keep bed low and locked with side rails adjusted as appropriate  - Keep care items and personal belongings within reach  - Initiate and maintain comfort rounds  - Make Fall Risk Sign visible to staff  - Offer Toileting every 2 Hours, in advance of need  - Initiate/Maintain bed alarm  - Obtain necessary fall risk management equipment  - Apply yellow socks and bracelet for high fall risk patients  - Consider moving patient to room near nurses station  Outcome: Progressing     Problem: PAIN - ADULT  Goal: Verbalizes/displays adequate comfort level or baseline comfort level  Description: Interventions:  - Encourage patient to monitor pain and request assistance  - Assess pain using appropriate pain scale  - Administer analgesics based on type and severity of pain and evaluate response  - Implement non-pharmacological measures as appropriate and evaluate response  - Consider cultural and social influences on pain and pain management  - Notify physician/advanced practitioner if interventions unsuccessful or patient reports new pain  Outcome: Progressing     Problem: INFECTION - ADULT  Goal: Absence or prevention of progression during hospitalization  Description: INTERVENTIONS:  - Assess and monitor for signs and symptoms of infection  - Monitor lab/diagnostic results  - Monitor all insertion sites, i.e. indwelling lines, tubes, and drains  - Monitor endotracheal if appropriate and nasal secretions for changes in amount and color  - Kite appropriate cooling/warming therapies per order  - Administer medications as ordered  - Instruct and encourage patient and family to use good hand hygiene  technique  - Identify and instruct in appropriate isolation precautions for identified infection/condition  Outcome: Progressing     Problem: SAFETY ADULT  Goal: Patient will remain free of falls  Description: INTERVENTIONS:  - Educate patient/family on patient safety including physical limitations  - Instruct patient to call for assistance with activity   - Consult OT/PT to assist with strengthening/mobility   - Keep Call bell within reach  - Keep bed low and locked with side rails adjusted as appropriate  - Keep care items and personal belongings within reach  - Initiate and maintain comfort rounds  - Make Fall Risk Sign visible to staff  - Offer Toileting every 2 Hours, in advance of need  - Initiate/Maintain bed alarm  - Obtain necessary fall risk management equipment  - Apply yellow socks and bracelet for high fall risk patients  - Consider moving patient to room near nurses station  Outcome: Progressing     Problem: DISCHARGE PLANNING  Goal: Discharge to home or other facility with appropriate resources  Description: INTERVENTIONS:  - Identify barriers to discharge w/patient and caregiver  - Arrange for needed discharge resources and transportation as appropriate  - Identify discharge learning needs (meds, wound care, etc.)  - Arrange for interpretive services to assist at discharge as needed  - Refer to Case Management Department for coordinating discharge planning if the patient needs post-hospital services based on physician/advanced practitioner order or complex needs related to functional status, cognitive ability, or social support system  Outcome: Progressing     Problem: Knowledge Deficit  Goal: Patient/family/caregiver demonstrates understanding of disease process, treatment plan, medications, and discharge instructions  Description: Complete learning assessment and assess knowledge base.  Interventions:  - Provide teaching at level of understanding  - Provide teaching via preferred learning  methods  Outcome: Progressing     Problem: RESPIRATORY - ADULT  Goal: Achieves optimal ventilation and oxygenation  Description: INTERVENTIONS:  - Assess for changes in respiratory status  - Assess for changes in mentation and behavior  - Position to facilitate oxygenation and minimize respiratory effort  - Oxygen administered by appropriate delivery if ordered  - Initiate smoking cessation education as indicated  - Encourage broncho-pulmonary hygiene including cough, deep breathe, Incentive Spirometry  - Assess the need for suctioning and aspirate as needed  - Assess and instruct to report SOB or any respiratory difficulty  - Respiratory Therapy support as indicated  Outcome: Progressing     Problem: Prexisting or High Potential for Compromised Skin Integrity  Goal: Skin integrity is maintained or improved  Description: INTERVENTIONS:  - Identify patients at risk for skin breakdown  - Assess and monitor skin integrity  - Assess and monitor nutrition and hydration status  - Monitor labs   - Assess for incontinence   - Turn and reposition patient  - Assist with mobility/ambulation  - Relieve pressure over bony prominences  - Avoid friction and shearing  - Provide appropriate hygiene as needed including keeping skin clean and dry  - Evaluate need for skin moisturizer/barrier cream  - Collaborate with interdisciplinary team   - Patient/family teaching  - Consider wound care consult   Outcome: Progressing

## 2024-09-03 NOTE — PROGRESS NOTES
[facility]  Progress Note  Name: Milton Ortiz I  MRN: [id number]  Unit/Bed#: S -01 I Date of Admission: 9/1/2024   Date of Service: 9/3/2024 I Hospital Day: 1    Assessment & Plan   * COVID-19  Assessment & Plan  Presented from home with general weakness, assisted fall.  Found to be COVID-positive 9//24.  Oxygen requirement: None.    Hold steroids  Risk factors: Type 2 diabetes.      PLAN  Continue remdesivir  Supportive care, gentle hydration  Tylenol for fever    Sepsis (HCC)  Assessment & Plan  Fever, tachypnea.on admission   No fevers in the last 24 hrs  Source: COVID  Normal Pro calcitonin  Continue to hold antibiotics  Management under covid    Type 2 diabetes mellitus with hyperglycemia (HCC)  Assessment & Plan  Lab Results   Component Value Date    HGBA1C 7.6 (H) 09/01/2024     Continue PTA:   Lantus 16 units HS  Humalog 10 units with dinner  Accu-Chek, SSI    Ambulatory dysfunction  Assessment & Plan  Likely worsened with acute infection.    Ambulates with walker at baseline.  Fall precaution,   OT recommends level 1  PT recommends level III           VTE Pharmacologic Prophylaxis: VTE Score: 6 High Risk (Score >/= 5) - Pharmacological DVT Prophylaxis Ordered: heparin. Sequential Compression Devices Ordered.    Mobility:   Basic Mobility Inpatient Raw Score: 19  JH-HLM Goal: 6: Walk 10 steps or more  JH-HLM Achieved: 6: Walk 10 steps or more  JH-HLM Goal achieved. Continue to encourage appropriate mobility.    Patient Centered Rounds: I performed bedside rounds with nursing staff today.   Discussions with Specialists or Other Care Team Provider: Internal Medicine     Education and Discussions with Family / Patient: Updated  (wife) at bedside.    Total Time Spent on Date of Encounter in care of patient: 30 mins. This time was spent on one or more of the following: performing physical exam; counseling and coordination of care; obtaining or reviewing history;  documenting in the medical record; reviewing/ordering tests, medications or procedures; communicating with other healthcare professionals and discussing with patient's family/caregivers.    Current Length of Stay: 1 day(s)  Current Patient Status: Inpatient   Certification Statement: The patient will continue to require additional inpatient hospital stay due to management of COVID  Discharge Plan: Anticipate discharge in 24-48 hrs to rehab facility.    Code Status: Level 1 - Full Code    Subjective:   Pt is alert, oriented to time place and person. Does not complain of fevers or chills however complains of generalized weakness. No shortness of breath, chest pain , loss of appetite.     Objective:     Vitals:   Temp (24hrs), Av.2 °F (37.3 °C), Min:98.6 °F (37 °C), Max:100 °F (37.8 °C)    Temp:  [98.6 °F (37 °C)-100 °F (37.8 °C)] 98.6 °F (37 °C)  HR:  [54-70] 70  Resp:  [16-19] 19  BP: (112-126)/(55-65) 126/64  SpO2:  [90 %-96 %] 91 %  Body mass index is 25.49 kg/m².     Input and Output Summary (last 24 hours):     Intake/Output Summary (Last 24 hours) at 9/3/2024 1322  Last data filed at 9/3/2024 1209  Gross per 24 hour   Intake 1328.33 ml   Output 850 ml   Net 478.33 ml       Physical Exam:   Physical Exam  HENT:      Head: Normocephalic.      Right Ear: External ear normal.      Left Ear: External ear normal.      Nose: Nose normal.      Mouth/Throat:      Mouth: Mucous membranes are dry.      Pharynx: Oropharynx is clear.   Eyes:      Extraocular Movements: Extraocular movements intact.      Conjunctiva/sclera: Conjunctivae normal.      Pupils: Pupils are equal, round, and reactive to light.   Cardiovascular:      Rate and Rhythm: Normal rate and regular rhythm.      Pulses: Normal pulses.      Heart sounds: Normal heart sounds.   Pulmonary:      Effort: Pulmonary effort is normal. No respiratory distress.      Breath sounds: Normal breath sounds.   Abdominal:      General: Bowel sounds are normal.       Palpations: Abdomen is soft.   Musculoskeletal:         General: Normal range of motion.   Skin:     General: Skin is warm.      Capillary Refill: Capillary refill takes less than 2 seconds.   Neurological:      General: No focal deficit present.      Mental Status: He is alert and oriented to person, place, and time.          Additional Data:     Labs:  Results from last 7 days   Lab Units 09/03/24  0619 09/02/24  0345   WBC Thousand/uL 5.01 5.70   HEMOGLOBIN g/dL 11.7* 12.1   HEMATOCRIT % 36.8 37.2   PLATELETS Thousands/uL 123* 143*   SEGS PCT %  --  74   LYMPHO PCT %  --  13*   MONO PCT %  --  11   EOS PCT %  --  1     Results from last 7 days   Lab Units 09/03/24  0619 09/02/24  0345   SODIUM mmol/L 137 136   POTASSIUM mmol/L 4.0 4.4   CHLORIDE mmol/L 106 104   CO2 mmol/L 23 27   BUN mg/dL 23 22   CREATININE mg/dL 0.98 1.17   ANION GAP mmol/L 8 5   CALCIUM mg/dL 8.0* 8.5   ALBUMIN g/dL  --  3.7   TOTAL BILIRUBIN mg/dL  --  0.56   ALK PHOS U/L  --  47   ALT U/L  --  16   AST U/L  --  20   GLUCOSE RANDOM mg/dL 74 154*     Results from last 7 days   Lab Units 09/01/24  2237   INR  0.99     Results from last 7 days   Lab Units 09/03/24  1052 09/03/24  0827 09/03/24  0657 09/02/24  2053 09/02/24  1927 09/02/24  1619 09/02/24  1115 09/02/24  0655 09/01/24  2151   POC GLUCOSE mg/dl 108 86 76 110 135 103 113 123 132     Results from last 7 days   Lab Units 09/01/24  1820   HEMOGLOBIN A1C % 7.6*     Results from last 7 days   Lab Units 09/01/24  2237 09/01/24  1820   LACTIC ACID mmol/L 1.3  --    PROCALCITONIN ng/ml  --  <0.05       Lines/Drains:  Invasive Devices       Peripheral Intravenous Line  Duration             Peripheral IV 09/01/24 Left Antecubital 1 day    Peripheral IV 09/01/24 Right Antecubital 1 day                          Imaging: Reviewed radiology reports from this admission including: chest xray    Recent Cultures (last 7 days):   Results from last 7 days   Lab Units 09/01/24 1826 09/01/24 1820    BLOOD CULTURE  No Growth at 24 hrs. No Growth at 24 hrs.       Last 24 Hours Medication List:   Current Facility-Administered Medications   Medication Dose Route Frequency Provider Last Rate    acetaminophen  650 mg Oral Q6H PRN JENNIFER Pepe      aspirin  81 mg Oral Daily JENNIFER Pepe      benzonatate  100 mg Oral TID PRN JENNIFER Pepe      Cholecalciferol  2,000 Units Oral Daily JENNIFER Pepe      ferrous sulfate  325 mg Oral Daily With Breakfast JENNIFER Pepe      heparin (porcine)  5,000 Units Subcutaneous Q8H RAE JENNIFER Pepe      insulin glargine  16 Units Subcutaneous HS JENNIFER Pepe      insulin lispro  1-5 Units Subcutaneous HS JENNIFER Pepe      insulin lispro  1-6 Units Subcutaneous TID AC JENNIFER Pepe      insulin lispro  10 Units Subcutaneous After Dinner JENNIFER Pepe      melatonin  3 mg Oral HS PRN JENNIFER Pepe      ondansetron  4 mg Intravenous Q8H PRN JENNIFER Pepe      phenol  1 spray Mouth/Throat Q2H PRN Myah Hammer MD      pravastatin  40 mg Oral HS JENNIFER Pepe      remdesivir  100 mg Intravenous Q24H JENNIFER Pepe      senna-docusate sodium  2 tablet Oral HS PRN JENNIFER Pepe          Today, Patient Was Seen By: Myah Hammer MD    **Please Note: This note may have been constructed using a voice recognition system.**

## 2024-09-03 NOTE — PLAN OF CARE
Problem: PHYSICAL THERAPY ADULT  Goal: Performs mobility at highest level of function for planned discharge setting.  See evaluation for individualized goals.  Description: Treatment/Interventions: Functional transfer training, LE strengthening/ROM, Elevations, Therapeutic exercise, Patient/family training, Equipment eval/education, Bed mobility, Cognitive reorientation, Endurance training, Gait training, Spoke to nursing, OT, Family  Equipment Recommended: Walker       See flowsheet documentation for full assessment, interventions and recommendations.  Note: Prognosis: Fair  Problem List: Decreased strength, Decreased range of motion, Decreased endurance, Impaired balance, Decreased mobility, Decreased safety awareness, Impaired vision, Impaired hearing (gait deviations)  Assessment: Pt is a 79 y.o. male seen for PT evaluation s/p admit to [facility] on 9/1/2024 w/Admit after fall in bathroom. Impression COVID, amb dysfunction (fall was slide off of bed), sepsis, DM w/ hyperglycemia. Order placed for PT.      Prior to admission: Pt lived in a bilevel home with no steps to enter but 7+ landing +7 stairs to the second floor.  Patient reportedly use a walker prior to this admission for community mobility, and mainly use the walker within his home environment.  Patient/spouse report times that patient was not using a walker in his home environment.  Upon evaluation: Pt needed no physical assistance for bed mobility or transfers, close supervision for ambulation when using a walker.      Pt's clinical presentation is currently unstable/unpredictable given the functional mobility deficits above, especially (but not limited to) gait deviations, decreased functional mobility tolerance, and SOB upon exertion, and combined with medical complications including abnormal H&H, new onset O2 use during admission, and impulsivity during admission.  Pt IS NOT at his baseline.  He is at risk for falls based on hx of falls,  impaired balance, limited sensation/neuropathy, and gait deviations .       During this admission, pt would benefit from continued skilled inpatient PT in the acute care setting in order to address deficits as defined above to maximize function and mobility.      Recommendations:     From a PT standpoint, recommend next several sessions focus on continued mobility training with using a rolling walker within the room, higher-level balance activities, step training as able with upper extremity support, patient family education.  Barriers to Discharge: Inaccessible home environment (spouse reports she should be able to provide at least min physical A upon DC if needed)     Rehab Resource Intensity Level, PT: III (Minimum Resource Intensity)    See flowsheet documentation for full assessment.

## 2024-09-03 NOTE — PHYSICAL THERAPY NOTE
PHYSICAL THERAPY EVALUATION  NAME:  Milton Ortiz  DATE: 09/03/24    AGE:   79 y.o.  Mrn:   [id number]  Principal problem: Principal Problem:    COVID-19  Active Problems:    Type 2 diabetes mellitus with hyperglycemia (HCC)    Ambulatory dysfunction    Sepsis (HCC)      Vitals:    09/03/24 0334 09/03/24 0658 09/03/24 1131 09/03/24 1134   BP: 126/65 126/64     BP Location:  Left arm     Pulse: 59 56 64 70   Resp:  19     Temp: 99.7 °F (37.6 °C) 98.6 °F (37 °C)     TempSrc:  Oral     SpO2: 90% 93% 94% 91%   Weight:           Length Of Stay: 1  Performed at least 2 patient identifiers during session: Name and Epic photo  PHYSICAL THERAPY EVALUATION :    09/03/24 1122   PT Last Visit   PT Visit Date 09/03/24   Note Type   Note type Evaluation   Pain Assessment   Pain Assessment Tool 0-10   Pain Score No Pain   Hospital Pain Intervention(s) Repositioned;Ambulation/increased activity;Emotional support   Restrictions/Precautions   Weight Bearing Precautions Per Order No   Other Precautions Contact/isolation;Airborne/isolation;Chair Alarm;Bed Alarm  (COVID. Pt wearing O2 upon entering room, but was not running)   Home Living   Type of Home House   Home Layout Multi-level  (bilevel, 0 ZOYA from garage, but 7+7 steps to main floor. FFSU w/ full bath is possible upon DC per OT notes.)   Home Equipment Walker   Additional Comments use of RW at baseline - Per OT notes: pt reports that the HHOT had turned the wheels to the inside of the walker so it would fit in the bathroom door. Per Pt/family he was unable to use rollator due to weight of it for spouse to move it in and out of car   Prior Function   Level of [place] Independent with ADLs   Lives With Spouse  (+ granddaughter)   Receives Help From Family   IADLs Family/Friend/Other provides transportation;Family/Friend/Other provides meals;Family/Friend/Other provides medication management  (wife completes IADL tasks. Pt able to participate in small tasks like putting away  "laundry)   Falls in the last 6 months 1 to 4  (1: reason for admission; Last fall prior to that was slightly before 6 month elizabeth per spouse)   Vocational Retired   General   Family/Caregiver Present Yes  (spouse)   Cognition   Overall Cognitive Status Impaired   Arousal/Participation Alert   Attention Attends with cues to redirect  (atrbites to [place])   Orientation Level Oriented to person;Oriented to place;Oriented to situation   Memory Decreased short term memory;Decreased recall of recent events   Following Commands Follows one step commands with increased time or repetition   Subjective   Subjective Pt pleasant and cooperative. Pt's spouse concerned as he \"had not been OOB since admission\" (was seen by OT yesterday. Denies any lightheadedness with upright positioning. Denies SOB. \"I want to go home\"   RUE Assessment   RUE Assessment WFL   LUE Assessment   LUE Assessment WFL   Strength RLE   R Hip Flexion 4+/5   R Knee Extension 4+/5   R Ankle Dorsiflexion 4/5   Strength LLE   L Hip Flexion 4+/5   L Knee Extension 4+/5   L Ankle Dorsiflexion 4/5   Vision-Basic Assessment   Current Vision   (spouse reports pt had recent blurriness)   Light Touch   RLE Light Touch Absent   RLE Light Touch Comments great toe, but remainder WFL   LLE Light Touch Absent   LLE Light Touch Comments great toe, but remainder WFL   Bed Mobility   Supine to Sit 5  Supervision   Additional items Assist x 1;HOB elevated;Bedrails;Increased time required;Verbal cues  (R egress)   Sit to Supine Unable to assess   Transfers   Sit to Stand 5  Supervision   Additional items Increased time required;Armrests   Stand to Sit 5  Supervision   Additional items Assist x 1;Bedrails;Increased time required   Ambulation/Elevation   Gait pattern Step through pattern;Excessively slow;Short stride  (limited heel to toe gait, increased walker advancement around obstacles)   Gait Assistance 5  Supervision  (close S)   Additional items Assist x 1;Verbal cues "   Assistive Device Rolling walker   Distance After performing marching x 5 reps for pregait activities, Performed 15'   Stair Management Assistance Not tested   Balance   Static Sitting Good   Static Standing Fair -   Ambulatory Fair -  (w/walker)   Endurance Deficit   Endurance Deficit Yes   Endurance Deficit Description limited amb distance and standing tolerance. Spo2 tested on RA, lowest noted w/adequate pleth ~ 91% post activity   Activity Tolerance   Activity Tolerance Patient limited by fatigue   Medical Staff Made Aware spoke to Housekeeping re: Pt and family's report of phone potentially falling on floor. Spoke to OT   Nurse Made Aware spoke to Trang MARTIN before and after session     Assessment:   Pt is a 79 y.o. male seen for PT evaluation s/p admit to [facility] on 9/1/2024 w/Admit after fall in bathroom. Impression COVID, amb dysfunction (fall was slide off of bed), sepsis, DM w/ hyperglycemia. Order placed for PT.      Prior to admission: Pt lived in a bilevel home with no steps to enter but 7+ landing +7 stairs to the second floor.  Patient reportedly use a walker prior to this admission for community mobility, and mainly use the walker within his home environment.  Patient/spouse report times that patient was not using a walker in his home environment.  Upon evaluation: Pt needed no physical assistance for bed mobility or transfers, close supervision for ambulation when using a walker.      Pt's clinical presentation is currently unstable/unpredictable given the functional mobility deficits above, especially (but not limited to) gait deviations, decreased functional mobility tolerance, and SOB upon exertion, and combined with medical complications including abnormal H&H, new onset O2 use during admission, and impulsivity during admission.  Pt IS NOT at his baseline.  He is at risk for falls based on hx of falls, impaired balance, limited sensation/neuropathy, and gait deviations.       During  this admission, pt would benefit from continued skilled inpatient PT in the acute care setting in order to address deficits as defined above to maximize function and mobility.      Recommendations:    From a PT standpoint, recommend next several sessions focus on continued mobility training with using a rolling walker within the room, higher-level balance activities, step training as able with upper extremity support, patient family education.     Prognosis Fair   Problem List Decreased strength;Decreased range of motion;Decreased endurance;Impaired balance;Decreased mobility;Decreased safety awareness;Impaired vision;Impaired hearing  (gait deviations)   Barriers to Discharge Inaccessible home environment  (spouse reports she should be able to provide at least min physical A upon DC if needed)   Goals   Patient Goals to go home today   STG Expiration Date 09/13/24   Short Term Goal #1 Goals: Pt will: Perform rolling  and supine<>sit bed mobility tasks with modified I to prepare for transfers and reposition in bed. Perform transfers with modified I to promote proper hand placement and approach. Perform ambulation with LRAD for at least 50' with Supervision to increase Indep in home environment and promote proper use of assistive device. Perform up to one flight of stairs w/one railing +/- DME and w/no more than min A to decrease burden of care and return to multilevel home. Perform TUG balance test and improve baseline score to demonstrate less risk for falls   PT Treatment Day 1   Plan   Treatment/Interventions Functional transfer training;LE strengthening/ROM;Elevations;Therapeutic exercise;Patient/family training;Equipment eval/education;Bed mobility;Cognitive reorientation;Endurance training;Gait training;Spoke to nursing;OT;Family   PT Frequency 3-5x/wk   Discharge Recommendation   Rehab Resource Intensity Level, PT III (Minimum Resource Intensity)   Equipment Recommended Walker   Additional Comments 2 Personal  factors affecting pt at time of IE include: multi-level environment, limited home support, past experience, behavioral pattern, inability to perform ADLs, inability to ambulate household distances, inability to navigate community distances, and recent fall(s).   AM-PAC Basic Mobility Inpatient   Turning in Flat Bed Without Bedrails 4   Lying on Back to Sitting on Edge of Flat Bed Without Bedrails 3   Moving Bed to Chair 3   Standing Up From Chair Using Arms 3   Walk in Room 3   Climb 3-5 Stairs With Railing 3   Basic Mobility Inpatient Raw Score 19   Basic Mobility Standardized Score 42.48   [facility] Highest Level Of Mobility   -HLM Goal 6: Walk 10 steps or more   -HLM Achieved 6: Walk 10 steps or more   Additional Treatment Session   Start Time 1130   End Time 1144   Treatment Assessment Patient was able to participate in higher-level balance activities, but needed no more than min assist for performing curb steps in the room BUE support, and only supervision for balance testing. Based on performance, anticipate that pt may need some physical A for performing steps (to second floor) upon DC, with spouse observing and reporting that she can provide that level of help. Recommend next session to trial increased activity tolerance, and performing (curb) step  w/unilateral support  with at least 7 reps without rest since pt may be able to have chair at stair landing   Equipment Use Rolling walker   Additional Treatment Day 1   Exercises   Neuro re-ed Transfers sit to stand supervision, performed tug balance test with rolling walker at 38 seconds with no physical assistance.  Additional time noted for standing tolerance prior to ambulation during TUG. Pt Performed 6' curb step w/ BUE support of walker and min A x 3 reps. Spo2 91% after performance. Pt/family education re: steps performance and A.   End of Consult   Patient Position at End of Consult Bed/Chair alarm activated;All needs within reach;Bedside chair  "  (Please find full objective findings from PT assessment regarding body systems outlined above).     The patient's -[facility] Basic Mobility Inpatient Short Form Raw Score is 19. A Raw score of greater than 16 suggests the patient may benefit from discharge to home, which MAY coincide with CURRENT above PT recommendations pending stairs performance.     However please refer to therapist recommendation for discharge planning given other factors that may influence destination.     Adapted from Cali GODINEZ, Odalis J, Char J, Allan MAY. [facility] “6-Clicks” Basic Mobility and Daily Activity Scores With Discharge Destination. Physical Therapy, 2021;101:1-9. DOI: 10.1093/ptj/czmv532    Portions of the record may have been created with voice recognition software.  Occasional wrong word or \"sound a like\" substitutions may have occurred due to the inherent limitations of voice recognition software.  Read the chart carefully and recognize, using context, where substitutions have occurred    "

## 2024-09-04 VITALS
HEART RATE: 59 BPM | BODY MASS INDEX: 25.49 KG/M2 | SYSTOLIC BLOOD PRESSURE: 138 MMHG | DIASTOLIC BLOOD PRESSURE: 71 MMHG | WEIGHT: 172.62 LBS | TEMPERATURE: 98.6 F | RESPIRATION RATE: 18 BRPM | OXYGEN SATURATION: 92 %

## 2024-09-04 LAB
ANION GAP SERPL CALCULATED.3IONS-SCNC: 5 MMOL/L (ref 4–13)
BUN SERPL-MCNC: 26 MG/DL (ref 5–25)
CALCIUM SERPL-MCNC: 8.2 MG/DL (ref 8.4–10.2)
CHLORIDE SERPL-SCNC: 107 MMOL/L (ref 96–108)
CO2 SERPL-SCNC: 27 MMOL/L (ref 21–32)
CREAT SERPL-MCNC: 1.02 MG/DL (ref 0.6–1.3)
ERYTHROCYTE [DISTWIDTH] IN BLOOD BY AUTOMATED COUNT: 13.8 % (ref 11.6–15.1)
GFR SERPL CREATININE-BSD FRML MDRD: 68 ML/MIN/1.73SQ M
GLUCOSE SERPL-MCNC: 59 MG/DL (ref 65–140)
GLUCOSE SERPL-MCNC: 70 MG/DL (ref 65–140)
GLUCOSE SERPL-MCNC: 91 MG/DL (ref 65–140)
HCT VFR BLD AUTO: 37.1 % (ref 36.5–49.3)
HGB BLD-MCNC: 12.3 G/DL (ref 12–17)
MCH RBC QN AUTO: 28.9 PG (ref 26.8–34.3)
MCHC RBC AUTO-ENTMCNC: 33.2 G/DL (ref 31.4–37.4)
MCV RBC AUTO: 87 FL (ref 82–98)
PLATELET # BLD AUTO: 144 THOUSANDS/UL (ref 149–390)
PMV BLD AUTO: 9.8 FL (ref 8.9–12.7)
POTASSIUM SERPL-SCNC: 3.6 MMOL/L (ref 3.5–5.3)
RBC # BLD AUTO: 4.25 MILLION/UL (ref 3.88–5.62)
S PYO DNA THROAT QL NAA+PROBE: NOT DETECTED
S PYO DNA THROAT QL NAA+PROBE: NOT DETECTED
SODIUM SERPL-SCNC: 139 MMOL/L (ref 135–147)
WBC # BLD AUTO: 5.33 THOUSAND/UL (ref 4.31–10.16)

## 2024-09-04 PROCEDURE — 99239 HOSP IP/OBS DSCHRG MGMT >30: CPT | Performed by: INTERNAL MEDICINE

## 2024-09-04 PROCEDURE — 82948 REAGENT STRIP/BLOOD GLUCOSE: CPT

## 2024-09-04 PROCEDURE — 87651 STREP A DNA AMP PROBE: CPT

## 2024-09-04 PROCEDURE — 80048 BASIC METABOLIC PNL TOTAL CA: CPT

## 2024-09-04 PROCEDURE — 85027 COMPLETE CBC AUTOMATED: CPT

## 2024-09-04 RX ORDER — POTASSIUM CHLORIDE 1500 MG/1
40 TABLET, EXTENDED RELEASE ORAL ONCE
Status: COMPLETED | OUTPATIENT
Start: 2024-09-04 | End: 2024-09-04

## 2024-09-04 RX ORDER — DIPHENHYDRAMINE HYDROCHLORIDE AND LIDOCAINE HYDROCHLORIDE AND ALUMINUM HYDROXIDE AND MAGNESIUM HYDRO
10 KIT EVERY 4 HOURS PRN
Status: DISCONTINUED | OUTPATIENT
Start: 2024-09-04 | End: 2024-09-04 | Stop reason: HOSPADM

## 2024-09-04 RX ORDER — DIPHENHYDRAMINE HYDROCHLORIDE AND LIDOCAINE HYDROCHLORIDE AND ALUMINUM HYDROXIDE AND MAGNESIUM HYDRO
10 KIT EVERY 4 HOURS PRN
Qty: 119 ML | Refills: 0 | Status: CANCELLED | OUTPATIENT
Start: 2024-09-04 | End: 2024-09-14

## 2024-09-04 RX ADMIN — HEPARIN SODIUM 5000 UNITS: 5000 INJECTION INTRAVENOUS; SUBCUTANEOUS at 06:11

## 2024-09-04 RX ADMIN — DIPHENHYDRAMINE HYDROCHLORIDE AND LIDOCAINE HYDROCHLORIDE AND ALUMINUM HYDROXIDE AND MAGNESIUM HYDRO 10 ML: KIT at 07:54

## 2024-09-04 RX ADMIN — DIPHENHYDRAMINE HYDROCHLORIDE AND LIDOCAINE HYDROCHLORIDE AND ALUMINUM HYDROXIDE AND MAGNESIUM HYDRO 10 ML: KIT at 04:06

## 2024-09-04 RX ADMIN — PHENOL 1 SPRAY: 1.5 LIQUID ORAL at 06:11

## 2024-09-04 RX ADMIN — FERROUS SULFATE TAB 325 MG (65 MG ELEMENTAL FE) 325 MG: 325 (65 FE) TAB at 07:54

## 2024-09-04 RX ADMIN — ASPIRIN 81 MG 81 MG: 81 TABLET ORAL at 08:00

## 2024-09-04 RX ADMIN — POTASSIUM CHLORIDE 40 MEQ: 1500 TABLET, EXTENDED RELEASE ORAL at 07:54

## 2024-09-04 RX ADMIN — ACETAMINOPHEN 650 MG: 325 TABLET ORAL at 11:47

## 2024-09-04 RX ADMIN — Medication 2000 UNITS: at 08:00

## 2024-09-04 NOTE — ASSESSMENT & PLAN NOTE
Presented from home with general weakness, assisted fall.  Found to be COVID-positive 9//24.  Oxygen requirement: None.    Hold steroids  Risk factors: Type 2 diabetes  Patient received 4 days of remdesivir    PLAN  Supportive care, gentle hydration  Tylenol for fever

## 2024-09-04 NOTE — DISCHARGE SUMMARY
[facility]  Discharge- Milton Ortiz 1945, 79 y.o. male MRN: [id number]  Unit/Bed#: S -01 Encounter: [id number]  Primary Care Provider: Shade Gordon DO   Date and time admitted to hospital: 9/1/2024  6:09 PM    * COVID-19  Assessment & Plan  Presented from home with general weakness, assisted fall.  Found to be COVID-positive 9//24.  Oxygen requirement: None.    Hold steroids  Risk factors: Type 2 diabetes  Patient received 4 days of remdesivir    PLAN  Supportive care, gentle hydration  Tylenol for fever    Sepsis (HCC)  Assessment & Plan  Fever, tachypnea.on admission   No fevers in the last 24 hrs  Source: COVID  Normal Pro calcitonin  Continue to hold antibiotics  Management under covid    PLAN:  Resolved     Type 2 diabetes mellitus with hyperglycemia (HCC)  Assessment & Plan  Lab Results   Component Value Date    HGBA1C 7.6 (H) 09/01/2024     Continue PTA:   Lantus 16 units HS  Humalog 10 units with dinner    Ambulatory dysfunction  Assessment & Plan  Likely worsened with acute infection.    Ambulates with walker at baseline.  Patient does not require rehab      Medical Problems       Resolved Problems  Date Reviewed: 9/4/2024   None       Discharging Physician / Practitioner: Myah Hammer MD  PCP: Shade Gordon DO  Admission Date:   Admission Orders (From admission, onward)       Ordered        09/02/24 1448  INPATIENT ADMISSION  Once            09/01/24 2050  Place in Observation  Once                          Discharge Date: 09/04/24    Consultations During Hospital Stay:  None    Procedures Performed:   None     Significant Findings / Test Results:   XR chest 1 view portable    Result Date: 9/2/2024  Impression: Low lung volumes producing vascular crowding. Increased bibasilar opacity, greater on the left, which could be due to atelectasis. Pneumonia not excluded in the appropriate clinical setting. Workstation performed: [id number]       No Chest XR results  available for this patient.      Incidental Findings:   None     Test Results Pending at Discharge (will require follow up):   None     Outpatient Tests Requested:  None     Complications:  None     Reason for Admission: Covid     Hospital Course:   Milton Ortiz is a 79 y.o. male patient who originally presented to the hospital on 9/1/2024 due to weakness, fall and poor appetite for the past 1 day.  Patient did not sustain any injuries on the fall, was able to help himself down.  Patient was found to be COVID-positive in the ED however did not require any oxygen requirements throughout this hospital stay.  Patient was started on remdesivir on admission, completed 4-days course.  Patient did not require any steroids or any other treatment except for Tylenol for fever and bodyaches.  Patient is stable enough to go home and will continue to follow-up with his PCP for further management.    Hospital Course: No notes on file    Please see above list of diagnoses and related plan for additional information.     Condition at Discharge: stable    Discharge Day Visit / Exam:   Subjective: Patient is alert, oriented to time place and person.  Patient is in discomfort, complaints of throat pain.  Patient denies any fevers, chills or shortness of breath or any other respiratory distress.  Vitals: Blood Pressure: 138/71 (09/04/24 0746)  Pulse: 59 (09/04/24 0746)  Temperature: 98.6 °F (37 °C) (09/04/24 0746)  Temp Source: Oral (09/04/24 0746)  Respirations: 18 (09/04/24 0746)  Weight - Scale: 78.3 kg (172 lb 9.9 oz) (09/01/24 1815)  SpO2: 92 % (09/04/24 0746)  Exam:   Physical Exam  Constitutional:       Appearance: Normal appearance.   HENT:      Head: Normocephalic and atraumatic.      Nose: Nose normal.      Mouth/Throat:      Mouth: Mucous membranes are dry.      Comments: No erythema, or thrush  Eyes:      Extraocular Movements: Extraocular movements intact.      Conjunctiva/sclera: Conjunctivae normal.      Pupils: Pupils  are equal, round, and reactive to light.   Cardiovascular:      Rate and Rhythm: Normal rate and regular rhythm.      Pulses: Normal pulses.      Heart sounds: Normal heart sounds.   Pulmonary:      Effort: Pulmonary effort is normal.      Breath sounds: Normal breath sounds.   Abdominal:      General: Bowel sounds are normal.      Palpations: Abdomen is soft.   Musculoskeletal:         General: Normal range of motion.   Skin:     General: Skin is warm.      Capillary Refill: Capillary refill takes less than 2 seconds.   Neurological:      General: No focal deficit present.      Mental Status: He is alert and oriented to person, place, and time.      Physical Exam  Constitutional:       Appearance: Normal appearance.   HENT:      Head: Normocephalic and atraumatic.      Nose: Nose normal.      Mouth/Throat:      Mouth: Mucous membranes are dry.      Comments: No erythema, or thrush  Eyes:      Extraocular Movements: Extraocular movements intact.      Conjunctiva/sclera: Conjunctivae normal.      Pupils: Pupils are equal, round, and reactive to light.   Cardiovascular:      Rate and Rhythm: Normal rate and regular rhythm.      Pulses: Normal pulses.      Heart sounds: Normal heart sounds.   Pulmonary:      Effort: Pulmonary effort is normal.      Breath sounds: Normal breath sounds.   Abdominal:      General: Bowel sounds are normal.      Palpations: Abdomen is soft.   Musculoskeletal:         General: Normal range of motion.   Skin:     General: Skin is warm.      Capillary Refill: Capillary refill takes less than 2 seconds.   Neurological:      General: No focal deficit present.      Mental Status: He is alert and oriented to person, place, and time.          Discussion with Family: Updated  (wife) at bedside.    Discharge instructions/Information to patient and family:   See after visit summary for information provided to patient and family.      Provisions for Follow-Up Care:  See after visit  summary for information related to follow-up care and any pertinent home health orders.      Mobility at time of Discharge:   Basic Mobility Inpatient Raw Score: 19  JH-HLM Goal: 6: Walk 10 steps or more  JH-HLM Achieved: 6: Walk 10 steps or more  HLM Goal achieved. Continue to encourage appropriate mobility.     Disposition:   Home    Planned Readmission: No     Discharge Statement:  I spent 30 minutes discharging the patient. This time was spent on the day of discharge. I had direct contact with the patient on the day of discharge. Greater than 50% of the total time was spent examining patient, answering all patient questions, arranging and discussing plan of care with patient as well as directly providing post-discharge instructions.  Additional time then spent on discharge activities.    Discharge Medications:  See after visit summary for reconciled discharge medications provided to patient and/or family.      **Please Note: This note may have been constructed using a voice recognition system**

## 2024-09-04 NOTE — ASSESSMENT & PLAN NOTE
Lab Results   Component Value Date    HGBA1C 7.6 (H) 09/01/2024     Continue PTA:   Lantus 16 units HS  Humalog 10 units with dinner

## 2024-09-04 NOTE — PHYSICAL THERAPY NOTE
Physical Therapy Cancellation Note       09/04/24 3502   Note Type   Note Type Cancelled Session   Cancel Reasons Refusal   Assessment   Assessment attempted to see pt for PT intervention. pt stated he is awaiting results of a test and to have his IV removed so he can go home. pt declined participation in PT intervention. notified Ruma of pt's requests.     Freedom Smith, PT

## 2024-09-04 NOTE — ASSESSMENT & PLAN NOTE
Fever, tachypnea.on admission   No fevers in the last 24 hrs  Source: COVID  Normal Pro calcitonin  Continue to hold antibiotics  Management under covid    PLAN:  Resolved

## 2024-09-04 NOTE — ASSESSMENT & PLAN NOTE
Likely worsened with acute infection.    Ambulates with walker at baseline.  Patient does not require rehab

## 2024-09-04 NOTE — DISCHARGE INSTR - AVS FIRST PAGE
Dear Milton Ortiz,     It was our pleasure to care for you here at [facility].  It is our hope that we were always able to exceed the expected standards for your care during your stay.  You were hospitalized due to COVID.  You were cared for on the South 4 floor by Myah Hammer MD under the service of Vanesa Gonzalez MD with the [facility] who covers for your primary care physician (PCP), Shade Gordon DO, while you were hospitalized.  If you have any questions or concerns related to this hospitalization, you may contact us at .  For follow up as well as any medication refills, we recommend that you follow up with your primary care physician.  A registered nurse will reach out to you by phone within a few days after your discharge to answer any additional questions that you may have after going home.  However, at this time we provide for you here, the most important instructions / recommendations at discharge:     Notable Medication Adjustments -   Please take phenol 1.4% chloraseptic, 1 spray to the mouth or throat every 2 hours as needed  Please continue taking your home medications as prescribed  Testing Required after Discharge -   None  ** Please contact your PCP to request testing orders for any of the testing recommended here **  Important follow up information -   Please follow-up with your PCP within 1 week of discharge  Other Instructions -   Please come back to the hospital if you experience shortness of breath, respiratory distress or any other alarming symptoms.  Please review this entire after visit summary as additional general instructions including medication list, appointments, activity, diet, any pertinent wound care, and other additional recommendations from your care team that may be provided for you.      Sincerely,     Myah Hammer MD

## 2024-09-04 NOTE — PLAN OF CARE
Problem: Potential for Falls  Goal: Patient will remain free of falls  Description: INTERVENTIONS:  - Educate patient/family on patient safety including physical limitations  - Instruct patient to call for assistance with activity   - Consult OT/PT to assist with strengthening/mobility   - Keep Call bell within reach  - Keep bed low and locked with side rails adjusted as appropriate  - Keep care items and personal belongings within reach  - Initiate and maintain comfort rounds  - Make Fall Risk Sign visible to staff  - Offer Toileting every 2 Hours, in advance of need  - Initiate/Maintain bed/chair alarm  - Obtain necessary fall risk management equipment:   - Apply yellow socks and bracelet for high fall risk patients  - Consider moving patient to room near nurses station  Outcome: Progressing     Problem: PAIN - ADULT  Goal: Verbalizes/displays adequate comfort level or baseline comfort level  Description: Interventions:  - Encourage patient to monitor pain and request assistance  - Assess pain using appropriate pain scale  - Administer analgesics based on type and severity of pain and evaluate response  - Implement non-pharmacological measures as appropriate and evaluate response  - Consider cultural and social influences on pain and pain management  - Notify physician/advanced practitioner if interventions unsuccessful or patient reports new pain  Outcome: Progressing     Problem: INFECTION - ADULT  Goal: Absence or prevention of progression during hospitalization  Description: INTERVENTIONS:  - Assess and monitor for signs and symptoms of infection  - Monitor lab/diagnostic results  - Monitor all insertion sites, i.e. indwelling lines, tubes, and drains  - Monitor endotracheal if appropriate and nasal secretions for changes in amount and color  - [place] appropriate cooling/warming therapies per order  - Administer medications as ordered  - Instruct and encourage patient and family to use good hand hygiene  technique  - Identify and instruct in appropriate isolation precautions for identified infection/condition  Outcome: Progressing     Problem: SAFETY ADULT  Goal: Patient will remain free of falls  Description: INTERVENTIONS:  - Educate patient/family on patient safety including physical limitations  - Instruct patient to call for assistance with activity   - Consult OT/PT to assist with strengthening/mobility   - Keep Call bell within reach  - Keep bed low and locked with side rails adjusted as appropriate  - Keep care items and personal belongings within reach  - Initiate and maintain comfort rounds  - Make Fall Risk Sign visible to staff  - Offer Toileting every 2 Hours, in advance of need  - Initiate/Maintain bed/chair alarm  - Obtain necessary fall risk management equipment:   - Apply yellow socks and bracelet for high fall risk patients  - Consider moving patient to room near nurses station  Outcome: Progressing     Problem: DISCHARGE PLANNING  Goal: Discharge to home or other facility with appropriate resources  Description: INTERVENTIONS:  - Identify barriers to discharge w/patient and caregiver  - Arrange for needed discharge resources and transportation as appropriate  - Identify discharge learning needs (meds, wound care, etc.)  - Arrange for interpretive services to assist at discharge as needed  - Refer to Case Management Department for coordinating discharge planning if the patient needs post-hospital services based on physician/advanced practitioner order or complex needs related to functional status, cognitive ability, or social support system  Outcome: Progressing     Problem: Knowledge Deficit  Goal: Patient/family/caregiver demonstrates understanding of disease process, treatment plan, medications, and discharge instructions  Description: Complete learning assessment and assess knowledge base.  Interventions:  - Provide teaching at level of understanding  - Provide teaching via preferred learning  methods  Outcome: Progressing     Problem: RESPIRATORY - ADULT  Goal: Achieves optimal ventilation and oxygenation  Description: INTERVENTIONS:  - Assess for changes in respiratory status  - Assess for changes in mentation and behavior  - Position to facilitate oxygenation and minimize respiratory effort  - Oxygen administered by appropriate delivery if ordered  - Initiate smoking cessation education as indicated  - Encourage broncho-pulmonary hygiene including cough, deep breathe, Incentive Spirometry  - Assess the need for suctioning and aspirate as needed  - Assess and instruct to report SOB or any respiratory difficulty  - Respiratory Therapy support as indicated  Outcome: Progressing     Problem: Prexisting or High Potential for Compromised Skin Integrity  Goal: Skin integrity is maintained or improved  Description: INTERVENTIONS:  - Identify patients at risk for skin breakdown  - Assess and monitor skin integrity  - Assess and monitor nutrition and hydration status  - Monitor labs   - Assess for incontinence   - Turn and reposition patient  - Assist with mobility/ambulation  - Relieve pressure over bony prominences  - Avoid friction and shearing  - Provide appropriate hygiene as needed including keeping skin clean and dry  - Evaluate need for skin moisturizer/barrier cream  - Collaborate with interdisciplinary team   - Patient/family teaching  - Consider wound care consult   Outcome: Progressing      yes

## 2024-09-07 LAB
BACTERIA BLD CULT: NORMAL
BACTERIA BLD CULT: NORMAL

## 2025-01-03 NOTE — ASSESSMENT & PLAN NOTE
Presented from home with general weakness, assisted fall.  Found to be COVID-positive 9/1.  Oxygen requirement: None.  Hold steroids  Risk factors: Type 2 diabetes.  Start remdesivir  Supportive care, gentle hydration   No